# Patient Record
Sex: FEMALE | Race: WHITE | NOT HISPANIC OR LATINO | Employment: FULL TIME | URBAN - METROPOLITAN AREA
[De-identification: names, ages, dates, MRNs, and addresses within clinical notes are randomized per-mention and may not be internally consistent; named-entity substitution may affect disease eponyms.]

---
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
## 2023-01-05 ENCOUNTER — OFFICE VISIT (OUTPATIENT)
Dept: BEHAVIORAL HEALTH | Facility: CLINIC | Age: 18
End: 2023-01-05
Payer: COMMERCIAL

## 2023-01-05 DIAGNOSIS — F32.0 CURRENT MILD EPISODE OF MAJOR DEPRESSIVE DISORDER, UNSPECIFIED WHETHER RECURRENT (HCC): ICD-10-CM

## 2023-01-05 PROCEDURE — 90791 PSYCH DIAGNOSTIC EVALUATION: CPT | Performed by: SOCIAL WORKER

## 2023-01-05 ASSESSMENT — PATIENT HEALTH QUESTIONNAIRE - PHQ9
5. POOR APPETITE OR OVEREATING: NOT AT ALL
4. FEELING TIRED OR HAVING LITTLE ENERGY: MORE THAN HALF THE DAYS
SUM OF ALL RESPONSES TO PHQ9 QUESTIONS 1 AND 2: 3
SUM OF ALL RESPONSES TO PHQ QUESTIONS 1-9: 7
2. FEELING DOWN, DEPRESSED, IRRITABLE, OR HOPELESS: MORE THAN HALF THE DAYS
9. THOUGHTS THAT YOU WOULD BE BETTER OFF DEAD, OR OF HURTING YOURSELF: NOT AT ALL
8. MOVING OR SPEAKING SO SLOWLY THAT OTHER PEOPLE COULD HAVE NOTICED. OR THE OPPOSITE, BEING SO FIGETY OR RESTLESS THAT YOU HAVE BEEN MOVING AROUND A LOT MORE THAN USUAL: NOT AT ALL
5. POOR APPETITE OR OVEREATING: 0 - NOT AT ALL
3. TROUBLE FALLING OR STAYING ASLEEP OR SLEEPING TOO MUCH: NOT AT ALL
CLINICAL INTERPRETATION OF PHQ2 SCORE: 2
7. TROUBLE CONCENTRATING ON THINGS, SUCH AS READING THE NEWSPAPER OR WATCHING TELEVISION: NOT AT ALL
6. FEELING BAD ABOUT YOURSELF - OR THAT YOU ARE A FAILURE OR HAVE LET YOURSELF OR YOUR FAMILY DOWN: MORE THAN HALF THE DAYS
SUM OF ALL RESPONSES TO PHQ QUESTIONS 1-9: 6
1. LITTLE INTEREST OR PLEASURE IN DOING THINGS: SEVERAL DAYS

## 2023-01-05 ASSESSMENT — ANXIETY QUESTIONNAIRES
GAD7 TOTAL SCORE: 3
2. NOT BEING ABLE TO STOP OR CONTROL WORRYING: NOT AT ALL
7. FEELING AFRAID AS IF SOMETHING AWFUL MIGHT HAPPEN: NOT AT ALL
IF YOU CHECKED OFF ANY PROBLEMS ON THIS QUESTIONNAIRE, HOW DIFFICULT HAVE THESE PROBLEMS MADE IT FOR YOU TO DO YOUR WORK, TAKE CARE OF THINGS AT HOME, OR GET ALONG WITH OTHER PEOPLE: NOT DIFFICULT AT ALL
3. WORRYING TOO MUCH ABOUT DIFFERENT THINGS: NOT AT ALL
6. BECOMING EASILY ANNOYED OR IRRITABLE: SEVERAL DAYS
4. TROUBLE RELAXING: NOT AT ALL
5. BEING SO RESTLESS THAT IT IS HARD TO SIT STILL: NOT AT ALL
1. FEELING NERVOUS, ANXIOUS, OR ON EDGE: MORE THAN HALF THE DAYS
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
## 2023-01-27 ENCOUNTER — OFFICE VISIT (OUTPATIENT)
Dept: BEHAVIORAL HEALTH | Facility: CLINIC | Age: 18
End: 2023-01-27
Payer: COMMERCIAL

## 2023-01-27 ENCOUNTER — EH NON-PROVIDER (OUTPATIENT)
Dept: OCCUPATIONAL MEDICINE | Facility: CLINIC | Age: 18
End: 2023-01-27
Payer: COMMERCIAL

## 2023-01-27 VITALS — SYSTOLIC BLOOD PRESSURE: 135 MMHG | HEART RATE: 80 BPM | DIASTOLIC BLOOD PRESSURE: 70 MMHG

## 2023-01-27 DIAGNOSIS — F33.42 RECURRENT MAJOR DEPRESSIVE DISORDER, IN FULL REMISSION (HCC): ICD-10-CM

## 2023-01-27 PROBLEM — F12.90 MARIJUANA USE, EPISODIC: Status: RESOLVED | Noted: 2022-01-21 | Resolved: 2023-01-27

## 2023-01-27 PROCEDURE — 99213 OFFICE O/P EST LOW 20 MIN: CPT | Performed by: PSYCHIATRY & NEUROLOGY

## 2023-01-27 RX ORDER — VENLAFAXINE 75 MG/1
75 TABLET ORAL DAILY
Qty: 90 TABLET | Refills: 2 | Status: SHIPPED | OUTPATIENT
Start: 2023-01-27 | End: 2023-04-28 | Stop reason: SDUPTHER

## 2023-01-27 RX ORDER — BUPROPION HYDROCHLORIDE 300 MG/1
300 TABLET ORAL EVERY MORNING
Qty: 90 TABLET | Refills: 2 | Status: SHIPPED | OUTPATIENT
Start: 2023-01-27 | End: 2023-04-28 | Stop reason: SDUPTHER

## 2023-01-27 ASSESSMENT — ENCOUNTER SYMPTOMS
EYES NEGATIVE: 1
MUSCULOSKELETAL NEGATIVE: 1
DECREASED APPETITE: 0
PANIC: 0
DECREASED NEED FOR SLEEP: 0
DELUSIONS: 0
INCREASED ENERGY: 0
RESPIRATORY NEGATIVE: 1
CONSTITUTIONAL NEGATIVE: 1
NEUROLOGICAL NEGATIVE: 1
PREOCCUPATION: 0
THOUGHT CONTENT - SHAME: 0
PREMATURE MORNING AWAKENING: 0
GASTROINTESTINAL NEGATIVE: 1
HOPELESSNESS: 0
CARDIOVASCULAR NEGATIVE: 1
INCREASED GOAL-DIRECTED ACTIVITY: 0
HYPERSOMNIA: 0
COMPULSIONS: 0
PARANOIA: 0
DECREASED ENERGY: 0
AWAKENING FROM SLEEP: 0
DIFFICULTY FALLING ASLEEP: 0

## 2023-01-27 ASSESSMENT — ANXIETY QUESTIONNAIRES
5. BEING SO RESTLESS THAT IT IS HARD TO SIT STILL: MORE THAN HALF THE DAYS
6. BECOMING EASILY ANNOYED OR IRRITABLE: NOT AT ALL
1. FEELING NERVOUS, ANXIOUS, OR ON EDGE: SEVERAL DAYS
3. WORRYING TOO MUCH ABOUT DIFFERENT THINGS: NOT AT ALL
7. FEELING AFRAID AS IF SOMETHING AWFUL MIGHT HAPPEN: NOT AT ALL
2. NOT BEING ABLE TO STOP OR CONTROL WORRYING: NOT AT ALL
4. TROUBLE RELAXING: SEVERAL DAYS
GAD7 TOTAL SCORE: 4
IF YOU CHECKED OFF ANY PROBLEMS ON THIS QUESTIONNAIRE, HOW DIFFICULT HAVE THESE PROBLEMS MADE IT FOR YOU TO DO YOUR WORK, TAKE CARE OF THINGS AT HOME, OR GET ALONG WITH OTHER PEOPLE: NOT DIFFICULT AT ALL

## 2023-01-27 ASSESSMENT — PATIENT HEALTH QUESTIONNAIRE - PHQ9
5. POOR APPETITE OR OVEREATING: 1 - SEVERAL DAYS
CLINICAL INTERPRETATION OF PHQ2 SCORE: 2
SUM OF ALL RESPONSES TO PHQ QUESTIONS 1-9: 9

## 2023-01-27 ASSESSMENT — SOCIAL DETERMINANTS OF HEALTH (SDOH): ANXIETY ASSOCIATED WITH SOCIAL SUPPORT SYSTEM: 0
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
## 2023-01-28 SDOH — HEALTH STABILITY: PHYSICAL HEALTH: ON AVERAGE, HOW MANY DAYS PER WEEK DO YOU ENGAGE IN MODERATE TO STRENUOUS EXERCISE (LIKE A BRISK WALK)?: 3 DAYS

## 2023-01-28 SDOH — ECONOMIC STABILITY: TRANSPORTATION INSECURITY
IN THE PAST 12 MONTHS, HAS THE LACK OF TRANSPORTATION KEPT YOU FROM MEDICAL APPOINTMENTS OR FROM GETTING MEDICATIONS?: NO

## 2023-01-28 SDOH — ECONOMIC STABILITY: INCOME INSECURITY: IN THE LAST 12 MONTHS, WAS THERE A TIME WHEN YOU WERE NOT ABLE TO PAY THE MORTGAGE OR RENT ON TIME?: NO

## 2023-01-28 SDOH — ECONOMIC STABILITY: FOOD INSECURITY: WITHIN THE PAST 12 MONTHS, YOU WORRIED THAT YOUR FOOD WOULD RUN OUT BEFORE YOU GOT MONEY TO BUY MORE.: SOMETIMES TRUE

## 2023-01-28 SDOH — ECONOMIC STABILITY: HOUSING INSECURITY: IN THE LAST 12 MONTHS, HOW MANY PLACES HAVE YOU LIVED?: 3

## 2023-01-28 SDOH — ECONOMIC STABILITY: FOOD INSECURITY: WITHIN THE PAST 12 MONTHS, THE FOOD YOU BOUGHT JUST DIDN'T LAST AND YOU DIDN'T HAVE MONEY TO GET MORE.: SOMETIMES TRUE

## 2023-01-28 SDOH — HEALTH STABILITY: PHYSICAL HEALTH: ON AVERAGE, HOW MANY MINUTES DO YOU ENGAGE IN EXERCISE AT THIS LEVEL?: 50 MIN

## 2023-01-28 SDOH — HEALTH STABILITY: MENTAL HEALTH
STRESS IS WHEN SOMEONE FEELS TENSE, NERVOUS, ANXIOUS, OR CAN'T SLEEP AT NIGHT BECAUSE THEIR MIND IS TROUBLED. HOW STRESSED ARE YOU?: NOT AT ALL

## 2023-01-28 SDOH — ECONOMIC STABILITY: HOUSING INSECURITY
IN THE LAST 12 MONTHS, WAS THERE A TIME WHEN YOU DID NOT HAVE A STEADY PLACE TO SLEEP OR SLEPT IN A SHELTER (INCLUDING NOW)?: NO

## 2023-01-28 SDOH — ECONOMIC STABILITY: INCOME INSECURITY: HOW HARD IS IT FOR YOU TO PAY FOR THE VERY BASICS LIKE FOOD, HOUSING, MEDICAL CARE, AND HEATING?: NOT VERY HARD

## 2023-01-28 SDOH — ECONOMIC STABILITY: TRANSPORTATION INSECURITY
IN THE PAST 12 MONTHS, HAS LACK OF RELIABLE TRANSPORTATION KEPT YOU FROM MEDICAL APPOINTMENTS, MEETINGS, WORK OR FROM GETTING THINGS NEEDED FOR DAILY LIVING?: NO

## 2023-01-28 SDOH — ECONOMIC STABILITY: TRANSPORTATION INSECURITY
IN THE PAST 12 MONTHS, HAS LACK OF TRANSPORTATION KEPT YOU FROM MEETINGS, WORK, OR FROM GETTING THINGS NEEDED FOR DAILY LIVING?: NO

## 2023-01-28 ASSESSMENT — SOCIAL DETERMINANTS OF HEALTH (SDOH)
DO YOU BELONG TO ANY CLUBS OR ORGANIZATIONS SUCH AS CHURCH GROUPS UNIONS, FRATERNAL OR ATHLETIC GROUPS, OR SCHOOL GROUPS?: NO
HOW OFTEN DO YOU ATTENT MEETINGS OF THE CLUB OR ORGANIZATION YOU BELONG TO?: NEVER
IN A TYPICAL WEEK, HOW MANY TIMES DO YOU TALK ON THE PHONE WITH FAMILY, FRIENDS, OR NEIGHBORS?: MORE THAN THREE TIMES A WEEK
HOW OFTEN DO YOU ATTEND CHURCH OR RELIGIOUS SERVICES?: 1 TO 4 TIMES PER YEAR
HOW MANY DRINKS CONTAINING ALCOHOL DO YOU HAVE ON A TYPICAL DAY WHEN YOU ARE DRINKING: PATIENT DOES NOT DRINK
ARE YOU MARRIED, WIDOWED, DIVORCED, SEPARATED, NEVER MARRIED, OR LIVING WITH A PARTNER?: NEVER MARRIED
WITHIN THE PAST 12 MONTHS, YOU WORRIED THAT YOUR FOOD WOULD RUN OUT BEFORE YOU GOT THE MONEY TO BUY MORE: SOMETIMES TRUE
HOW OFTEN DO YOU HAVE A DRINK CONTAINING ALCOHOL: NEVER
HOW OFTEN DO YOU HAVE SIX OR MORE DRINKS ON ONE OCCASION: NEVER
DO YOU BELONG TO ANY CLUBS OR ORGANIZATIONS SUCH AS CHURCH GROUPS UNIONS, FRATERNAL OR ATHLETIC GROUPS, OR SCHOOL GROUPS?: NO
HOW OFTEN DO YOU ATTENT MEETINGS OF THE CLUB OR ORGANIZATION YOU BELONG TO?: NEVER
ARE YOU MARRIED, WIDOWED, DIVORCED, SEPARATED, NEVER MARRIED, OR LIVING WITH A PARTNER?: NEVER MARRIED
HOW HARD IS IT FOR YOU TO PAY FOR THE VERY BASICS LIKE FOOD, HOUSING, MEDICAL CARE, AND HEATING?: NOT VERY HARD
HOW OFTEN DO YOU GET TOGETHER WITH FRIENDS OR RELATIVES?: MORE THAN THREE TIMES A WEEK
IN A TYPICAL WEEK, HOW MANY TIMES DO YOU TALK ON THE PHONE WITH FAMILY, FRIENDS, OR NEIGHBORS?: MORE THAN THREE TIMES A WEEK
HOW OFTEN DO YOU ATTEND CHURCH OR RELIGIOUS SERVICES?: 1 TO 4 TIMES PER YEAR
HOW OFTEN DO YOU GET TOGETHER WITH FRIENDS OR RELATIVES?: MORE THAN THREE TIMES A WEEK

## 2023-01-28 ASSESSMENT — LIFESTYLE VARIABLES
HOW OFTEN DO YOU HAVE A DRINK CONTAINING ALCOHOL: NEVER
AUDIT-C TOTAL SCORE: 0
HOW MANY STANDARD DRINKS CONTAINING ALCOHOL DO YOU HAVE ON A TYPICAL DAY: PATIENT DOES NOT DRINK
HOW OFTEN DO YOU HAVE SIX OR MORE DRINKS ON ONE OCCASION: NEVER
SKIP TO QUESTIONS 9-10: 1

## 2023-01-31 ENCOUNTER — OFFICE VISIT (OUTPATIENT)
Dept: MEDICAL GROUP | Facility: PHYSICIAN GROUP | Age: 18
End: 2023-01-31
Payer: COMMERCIAL

## 2023-01-31 VITALS
RESPIRATION RATE: 18 BRPM | WEIGHT: 222.8 LBS | SYSTOLIC BLOOD PRESSURE: 120 MMHG | HEART RATE: 100 BPM | HEIGHT: 68 IN | TEMPERATURE: 98.1 F | DIASTOLIC BLOOD PRESSURE: 84 MMHG | OXYGEN SATURATION: 99 % | BODY MASS INDEX: 33.77 KG/M2

## 2023-01-31 DIAGNOSIS — Z13.29 SCREENING FOR ENDOCRINE, NUTRITIONAL, METABOLIC AND IMMUNITY DISORDER: ICD-10-CM

## 2023-01-31 DIAGNOSIS — Z13.0 SCREENING FOR ENDOCRINE, NUTRITIONAL, METABOLIC AND IMMUNITY DISORDER: ICD-10-CM

## 2023-01-31 DIAGNOSIS — Z13.21 SCREENING FOR ENDOCRINE, NUTRITIONAL, METABOLIC AND IMMUNITY DISORDER: ICD-10-CM

## 2023-01-31 DIAGNOSIS — Z11.3 SCREEN FOR SEXUALLY TRANSMITTED DISEASES: ICD-10-CM

## 2023-01-31 DIAGNOSIS — E78.2 MODERATE MIXED HYPERLIPIDEMIA NOT REQUIRING STATIN THERAPY: ICD-10-CM

## 2023-01-31 DIAGNOSIS — G43.829 MENSTRUAL MIGRAINE WITHOUT STATUS MIGRAINOSUS, NOT INTRACTABLE: ICD-10-CM

## 2023-01-31 DIAGNOSIS — Z13.71 TESTING OF FEMALE FOR GENETIC DISEASE CARRIER STATUS: ICD-10-CM

## 2023-01-31 DIAGNOSIS — F41.1 GENERALIZED ANXIETY DISORDER: ICD-10-CM

## 2023-01-31 DIAGNOSIS — Z13.0 SCREENING FOR IRON DEFICIENCY ANEMIA: ICD-10-CM

## 2023-01-31 DIAGNOSIS — F41.0 PANIC ATTACK: ICD-10-CM

## 2023-01-31 DIAGNOSIS — Z13.228 SCREENING FOR ENDOCRINE, NUTRITIONAL, METABOLIC AND IMMUNITY DISORDER: ICD-10-CM

## 2023-01-31 DIAGNOSIS — K21.9 GASTROESOPHAGEAL REFLUX DISEASE WITHOUT ESOPHAGITIS: ICD-10-CM

## 2023-01-31 PROBLEM — G43.909 MIGRAINE: Status: ACTIVE | Noted: 2020-02-01

## 2023-01-31 PROBLEM — F32.4 MAJOR DEPRESSIVE DISORDER, SINGLE EPISODE, IN PARTIAL REMISSION (HCC): Status: ACTIVE | Noted: 2020-11-10

## 2023-01-31 PROBLEM — E78.5 HYPERLIPIDEMIA: Status: ACTIVE | Noted: 2019-11-06

## 2023-01-31 PROCEDURE — 99214 OFFICE O/P EST MOD 30 MIN: CPT

## 2023-01-31 ASSESSMENT — ENCOUNTER SYMPTOMS
DIZZINESS: 0
EYE PAIN: 0
PALPITATIONS: 0
HALLUCINATIONS: 0
WEIGHT LOSS: 0
HEMOPTYSIS: 0
CHILLS: 0
FEVER: 0
DEPRESSION: 0
HEARTBURN: 1
HEADACHES: 0
NAUSEA: 0
COUGH: 0
BLURRED VISION: 0
BRUISES/BLEEDS EASILY: 0

## 2023-01-31 ASSESSMENT — FIBROSIS 4 INDEX: FIB4 SCORE: 0.21

## 2023-01-31 NOTE — ASSESSMENT & PLAN NOTE
Patient reports she started getting migraines when she got her menstrual cycle for the first time. It has not been as bad recently, but she still gets them a few times a year. They resolve with rest and an excedrin migraine medication.

## 2023-01-31 NOTE — ASSESSMENT & PLAN NOTE
Patient reports that she has a strong family history of high cholesterol. She thinks she has had labs before that confirms she has this as well.

## 2023-01-31 NOTE — PROGRESS NOTES
CC:    Chief Complaint   Patient presents with    [facility]        HISTORY OF THE PRESENT ILLNESS: This is a pleasant 17 y.o. female presenting today to [facility], who wishes to discuss the following problems listed below:     Generalized anxiety disorder  Patient reports that she was diagnosed with depression and anxiety in around 2020, during the pandemic. She sees a psychiatrist and has a therapist, both of which she finds helpful. She has been on the wellbutrin 300mg XL and venlafaxine 75mg daily combination for about a year.     Migraine  Patient reports she started getting migraines when she got her menstrual cycle for the first time. It has not been as bad recently, but she still gets them a few times a year. They resolve with rest and an excedrin migraine medication.     Hyperlipidemia  Patient reports that she has a strong family history of high cholesterol. She thinks she has had labs before that confirms she has this as well.       Past Medical History:   Diagnosis Date    Anxiety     Depression     Ovarian cyst        Allergies: Influenza vaccine live    Current Outpatient Medications   Medication Sig Dispense Refill    venlafaxine (EFFEXOR) 75 MG Tab Take 1 Tablet by mouth every day. 90 Tablet 2    buPROPion (WELLBUTRIN XL) 300 MG XL tablet Take 1 Tablet by mouth every morning. 90 Tablet 2    etonogestrel (NEXPLANON) 68 MG Implant implant Inject  under the skin.       No current facility-administered medications for this visit.       ROS:     Review of Systems   Constitutional:  Negative for chills, fever and weight loss.   HENT:  Negative for hearing loss and tinnitus.    Eyes:  Negative for blurred vision and pain.   Respiratory:  Negative for cough and hemoptysis.    Cardiovascular:  Negative for chest pain, palpitations and leg swelling.   Gastrointestinal:  Positive for heartburn. Negative for nausea.   Genitourinary:  Negative for dysuria.   Musculoskeletal:  Negative for joint pain.  "  Skin:  Negative for itching and rash.   Neurological:  Negative for dizziness and headaches.   Endo/Heme/Allergies:  Does not bruise/bleed easily.   Psychiatric/Behavioral:  Negative for depression, hallucinations and suicidal ideas.      Exam: /84 (BP Location: Left arm, Patient Position: Sitting, BP Cuff Size: Adult)   Pulse 100   Temp 36.7 °C (98.1 °F) (Temporal)   Resp 18   Ht 1.72 m (5' 7.72\")   Wt 101 kg (222 lb 12.8 oz)   SpO2 99%  Body mass index is 34.16 kg/m².    Physical Exam  Constitutional:       Appearance: Normal appearance.   Cardiovascular:      Rate and Rhythm: Normal rate and regular rhythm.      Heart sounds: Normal heart sounds.   Pulmonary:      Effort: Pulmonary effort is normal.      Breath sounds: Normal breath sounds.   Musculoskeletal:      Cervical back: Normal range of motion and neck supple.   Lymphadenopathy:      Cervical: No cervical adenopathy.   Neurological:      General: No focal deficit present.      Mental Status: She is alert and oriented to person, place, and time.   Psychiatric:         Mood and Affect: Mood normal.         Behavior: Behavior normal.         Assessment/Plan:    17 y.o. female with the followin. Generalized anxiety disorder  2. Panic attack  Chronic, stable.  Continue Wellbutrin extended release 300 mg every morning as well as venlafaxine 75 mg daily.  She does report that she felt like she had some anxiety prior to getting her Nexplanon implant, we did discuss that sometimes Nexplanon implants can exacerbate or worsen depression and anxiety.  She is happy with her Nexplanon and does not want this removed    3. Gastroesophageal reflux disease without esophagitis  Chronic, not at goal.  Obtain H. pylori breath test, otherwise patient may manage with over-the-counter omeprazole 20 mg as needed  - H. PYLORI, UREA BREATH TEST, ADULT; Future    4. Menstrual migraine without status migrainosus, not intractable  Chronic, stable.  Continue " management with over-the-counter Excedrin Migraine    5. Moderate mixed hyperlipidemia not requiring statin therapy  Chronic, unclear status.  Obtain lipids for further evaluation  - Lipid Profile; Future    6. Testing of female for genetic disease carrier status  - Referral to Genetic Research Studies    7. Screening for endocrine, nutritional, metabolic and immunity disorder  - Comp Metabolic Panel; Future  - TSH WITH REFLEX TO FT4; Future  - VITAMIN D,25 HYDROXY (DEFICIENCY); Future    8. Screening for iron deficiency anemia  - CBC WITHOUT DIFFERENTIAL; Future    9. Screen for sexually transmitted diseases  - Chlamydia/GC, PCR (Urine); Future    Follow-up: Return in about 4 weeks (around 2/28/2023) for lab follow up.    Health Maintenance: Completed    I have placed the below orders and discussed them with an approved delegating provider.  The MA is performing the below orders under the direction of Rita Magallon MD.    Please note that this dictation was created using voice recognition software. I have made every reasonable attempt to correct obvious errors, but I expect that there are errors of grammar and possibly content that I did not discover before finalizing the note.    Electronically signed by MAI Wolf on January 31, 2023

## 2023-01-31 NOTE — ASSESSMENT & PLAN NOTE
Patient reports that she was diagnosed with depression and anxiety in around 2020, during the pandemic. She sees a psychiatrist and has a therapist, both of which she finds helpful. She has been on the wellbutrin 300mg XL and venlafaxine 75mg daily combination for about a year.

## 2023-02-15 ENCOUNTER — RESEARCH ENCOUNTER (OUTPATIENT)
Dept: RESEARCH | Facility: WORKSITE | Age: 18
End: 2023-02-15
Payer: COMMERCIAL

## 2023-02-15 DIAGNOSIS — Z00.6 RESEARCH STUDY PATIENT: ICD-10-CM

## 2023-02-21 ENCOUNTER — HOSPITAL ENCOUNTER (OUTPATIENT)
Dept: LAB | Facility: MEDICAL CENTER | Age: 18
End: 2023-02-21
Payer: COMMERCIAL

## 2023-02-21 DIAGNOSIS — Z11.3 SCREEN FOR SEXUALLY TRANSMITTED DISEASES: ICD-10-CM

## 2023-02-21 DIAGNOSIS — Z13.0 SCREENING FOR IRON DEFICIENCY ANEMIA: ICD-10-CM

## 2023-02-21 DIAGNOSIS — Z13.29 SCREENING FOR ENDOCRINE, NUTRITIONAL, METABOLIC AND IMMUNITY DISORDER: ICD-10-CM

## 2023-02-21 DIAGNOSIS — Z13.21 SCREENING FOR ENDOCRINE, NUTRITIONAL, METABOLIC AND IMMUNITY DISORDER: ICD-10-CM

## 2023-02-21 DIAGNOSIS — Z13.0 SCREENING FOR ENDOCRINE, NUTRITIONAL, METABOLIC AND IMMUNITY DISORDER: ICD-10-CM

## 2023-02-21 DIAGNOSIS — Z13.228 SCREENING FOR ENDOCRINE, NUTRITIONAL, METABOLIC AND IMMUNITY DISORDER: ICD-10-CM

## 2023-02-21 DIAGNOSIS — K21.9 GASTROESOPHAGEAL REFLUX DISEASE WITHOUT ESOPHAGITIS: ICD-10-CM

## 2023-02-21 DIAGNOSIS — E78.2 MODERATE MIXED HYPERLIPIDEMIA NOT REQUIRING STATIN THERAPY: ICD-10-CM

## 2023-02-21 LAB
ALBUMIN SERPL BCP-MCNC: 4.6 G/DL (ref 3.2–4.9)
ALBUMIN/GLOB SERPL: 1.8 G/DL
ALP SERPL-CCNC: 63 U/L (ref 30–99)
ALT SERPL-CCNC: 29 U/L (ref 2–50)
ANION GAP SERPL CALC-SCNC: 8 MMOL/L (ref 7–16)
AST SERPL-CCNC: 13 U/L (ref 12–45)
BILIRUB SERPL-MCNC: 0.2 MG/DL (ref 0.1–1.5)
BUN SERPL-MCNC: 10 MG/DL (ref 8–22)
CALCIUM ALBUM COR SERPL-MCNC: 8.7 MG/DL (ref 8.5–10.5)
CALCIUM SERPL-MCNC: 9.2 MG/DL (ref 8.5–10.5)
CHLORIDE SERPL-SCNC: 104 MMOL/L (ref 96–112)
CHOLEST SERPL-MCNC: 191 MG/DL (ref 100–199)
CO2 SERPL-SCNC: 25 MMOL/L (ref 20–33)
CREAT SERPL-MCNC: 0.69 MG/DL (ref 0.5–1.4)
ERYTHROCYTE [DISTWIDTH] IN BLOOD BY AUTOMATED COUNT: 41.1 FL (ref 35.9–50)
FASTING STATUS PATIENT QL REPORTED: NORMAL
GFR SERPLBLD CREATININE-BSD FMLA CKD-EPI: 128 ML/MIN/1.73 M 2
GLOBULIN SER CALC-MCNC: 2.6 G/DL (ref 1.9–3.5)
GLUCOSE SERPL-MCNC: 94 MG/DL (ref 65–99)
HCT VFR BLD AUTO: 46.3 % (ref 37–47)
HDLC SERPL-MCNC: 37 MG/DL
HGB BLD-MCNC: 15.1 G/DL (ref 12–16)
LDLC SERPL CALC-MCNC: 127 MG/DL
MCH RBC QN AUTO: 28.9 PG (ref 27–33)
MCHC RBC AUTO-ENTMCNC: 32.6 G/DL (ref 33.6–35)
MCV RBC AUTO: 88.7 FL (ref 81.4–97.8)
PLATELET # BLD AUTO: 279 K/UL (ref 164–446)
PMV BLD AUTO: 10.1 FL (ref 9–12.9)
POTASSIUM SERPL-SCNC: 4.3 MMOL/L (ref 3.6–5.5)
PROT SERPL-MCNC: 7.2 G/DL (ref 6–8.2)
RBC # BLD AUTO: 5.22 M/UL (ref 4.2–5.4)
SODIUM SERPL-SCNC: 137 MMOL/L (ref 135–145)
TRIGL SERPL-MCNC: 136 MG/DL (ref 0–149)
WBC # BLD AUTO: 9.1 K/UL (ref 4.8–10.8)

## 2023-02-21 PROCEDURE — 82306 VITAMIN D 25 HYDROXY: CPT

## 2023-02-21 PROCEDURE — 80061 LIPID PANEL: CPT

## 2023-02-21 PROCEDURE — 83013 H PYLORI (C-13) BREATH: CPT

## 2023-02-21 PROCEDURE — 85027 COMPLETE CBC AUTOMATED: CPT

## 2023-02-21 PROCEDURE — 80053 COMPREHEN METABOLIC PANEL: CPT

## 2023-02-21 PROCEDURE — 87491 CHLMYD TRACH DNA AMP PROBE: CPT

## 2023-02-21 PROCEDURE — 36415 COLL VENOUS BLD VENIPUNCTURE: CPT

## 2023-02-21 PROCEDURE — 84443 ASSAY THYROID STIM HORMONE: CPT

## 2023-02-21 PROCEDURE — 87591 N.GONORRHOEAE DNA AMP PROB: CPT

## 2023-02-22 LAB
25(OH)D3 SERPL-MCNC: 24 NG/ML (ref 30–100)
C TRACH DNA SPEC QL NAA+PROBE: NEGATIVE
N GONORRHOEA DNA SPEC QL NAA+PROBE: NEGATIVE
SPECIMEN SOURCE: NORMAL
TSH SERPL DL<=0.005 MIU/L-ACNC: 4.41 UIU/ML (ref 0.38–5.33)

## 2023-02-23 LAB — UREA BREATH TEST QL: NEGATIVE

## 2023-02-27 ENCOUNTER — OFFICE VISIT (OUTPATIENT)
Dept: BEHAVIORAL HEALTH | Facility: CLINIC | Age: 18
End: 2023-02-27
Payer: COMMERCIAL

## 2023-02-27 DIAGNOSIS — F41.1 GENERALIZED ANXIETY DISORDER: ICD-10-CM

## 2023-02-27 PROCEDURE — 90834 PSYTX W PT 45 MINUTES: CPT | Performed by: SOCIAL WORKER

## 2023-02-27 NOTE — PROGRESS NOTES
"[facility]   Psychotherapy Progress Note        Name: SHAHAB WEAVER  MRN: [id number]  : 2005  Age: 17 y.o.  Date of assessment: 2023  PCP: DEIDRE Groves  Persons in attendance: Patient  Total session time: 50 minutes      Topics addressed in psychotherapy include: \" Things are pretty good. My dad's girlfriend had the baby but I did not see him yet, it Is kind of weird she seems to stay in her room with the baby according to my siblings. They have not seen him either but one or two times.\" AT this time she is not comfortable about discussing this with her father.  Discussion on advocating for herself. Discussed a situation at work where she did not notify her supervisor of something and felt at first she was being blamed but her supervisor listened to her and she felt better. Role played different ways the conversation could have been done. Also discussed perhaps exploring some roups that she might join to meet more people her age.practicing with her mother and boyfriend and even instructors at school advocating for herself.    Objective Observations:   Participation:Active verbal participation and Open to feedback   Grooming:Neat   Cognition:Alert   Eye Contact:Good   Mood:Anxious if she focuses on self   Affect:Congruent with content   Thought Process:Goal-directed   Speech:Rate within normal limits and Volume within normal limits    Current Risk:   Suicide: not indicated   Homicide: not indicated   Self-Harm: not indicated   Relapse: not indicated   Safety Plan Reviewed: not applicable    Care Plan Updated: Yes    Does patient express agreement with the above plan? Yes     Diagnosis:  1. Generalized anxiety disorder        Referral appointment(s) scheduled?  Made earlier        Clay Paul L.C.S.W.   "

## 2023-03-20 ENCOUNTER — OFFICE VISIT (OUTPATIENT)
Dept: BEHAVIORAL HEALTH | Facility: CLINIC | Age: 18
End: 2023-03-20
Payer: COMMERCIAL

## 2023-03-20 DIAGNOSIS — F41.1 GENERALIZED ANXIETY DISORDER: ICD-10-CM

## 2023-03-20 PROCEDURE — 90834 PSYTX W PT 45 MINUTES: CPT | Performed by: SOCIAL WORKER

## 2023-03-20 NOTE — PROGRESS NOTES
"[facility]   psychotherapy Progress Note        Name: SHAHAB WEAVER  MRN: [id number]  : 2005  Age: 17 y.o.  Date of assessment: 3/20/2023  PCP: DEIDRE Groves  Persons in attendance: Patient  Total session time: 48 minutes      Topics addressed in psychotherapy include: \" My sister's surgery was today we went down over the weekend. I was able to get aalong with my dad by just staying focused on my sister. It went okay. My mom stayed and should be back tomorrow if my sister is released. She wants to go to [facility] here but my dad told her she should stay in [place].  My boyfriend quit his job without having another one and he also charge up his credit card.This will be the second time I have bailed out. I do not want to do that all of the time.\"    Discussion on communication in relationships and setting up expectations of each other now when the relationship is still pretty young.    Objective Observations:   Participation:Active verbal participation and Open to feedback   Grooming:Casual and Neat   Cognition:Alert   Eye Contact:Good   Mood:Euthymic   Affect:Congruent with content   Thought Process:Goal-directed   Speech:Rate within normal limits and Volume within normal limits    Current Risk:   Suicide: not indicated   Homicide: not indicated   Self-Harm: not indicated   Relapse: not indicated   Safety Plan Reviewed: not applicable    Care Plan Updated:  no changes    Does patient express agreement with the above plan? Yes     Diagnosis:  No diagnosis found.    Referral appointment(s) scheduled?  Made prior        Clay Paul L.C.S.W.   "

## 2023-04-19 LAB
APOB+LDLR+PCSK9 GENE MUT ANL BLD/T: NOT DETECTED
BRCA1+BRCA2 DEL+DUP + FULL MUT ANL BLD/T: NOT DETECTED
MLH1+MSH2+MSH6+PMS2 GN DEL+DUP+FUL M: NOT DETECTED

## 2023-04-28 ENCOUNTER — OFFICE VISIT (OUTPATIENT)
Dept: BEHAVIORAL HEALTH | Facility: CLINIC | Age: 18
End: 2023-04-28
Payer: COMMERCIAL

## 2023-04-28 VITALS — DIASTOLIC BLOOD PRESSURE: 72 MMHG | SYSTOLIC BLOOD PRESSURE: 112 MMHG

## 2023-04-28 DIAGNOSIS — F41.1 GENERALIZED ANXIETY DISORDER: ICD-10-CM

## 2023-04-28 DIAGNOSIS — F33.42 RECURRENT MAJOR DEPRESSIVE DISORDER, IN FULL REMISSION (HCC): ICD-10-CM

## 2023-04-28 PROCEDURE — 99214 OFFICE O/P EST MOD 30 MIN: CPT | Performed by: PSYCHIATRY & NEUROLOGY

## 2023-04-28 RX ORDER — VENLAFAXINE 75 MG/1
75 TABLET ORAL DAILY
Qty: 90 TABLET | Refills: 1 | Status: SHIPPED | OUTPATIENT
Start: 2023-04-28 | End: 2023-08-18 | Stop reason: SDUPTHER

## 2023-04-28 RX ORDER — BUPROPION HYDROCHLORIDE 300 MG/1
300 TABLET ORAL EVERY MORNING
Qty: 90 TABLET | Refills: 1 | Status: SHIPPED | OUTPATIENT
Start: 2023-04-28 | End: 2023-08-18 | Stop reason: SDUPTHER

## 2023-04-28 ASSESSMENT — ENCOUNTER SYMPTOMS
DECREASED NEED FOR SLEEP: 0
DECREASED APPETITE: 0
PANIC: 0
COMPULSIONS: 0
CARDIOVASCULAR NEGATIVE: 1
DIFFICULTY FALLING ASLEEP: 0
INCREASED GOAL-DIRECTED ACTIVITY: 0
CONSTITUTIONAL NEGATIVE: 1
GASTROINTESTINAL NEGATIVE: 1
PARANOIA: 0
AWAKENING FROM SLEEP: 0
THOUGHT CONTENT - SHAME: 0
INCREASED ENERGY: 0
MUSCULOSKELETAL NEGATIVE: 1
HOPELESSNESS: 0
EYES NEGATIVE: 1
DELUSIONS: 0
RESPIRATORY NEGATIVE: 1
PREMATURE MORNING AWAKENING: 0
PREOCCUPATION: 0
NEUROLOGICAL NEGATIVE: 1
HYPERSOMNIA: 0
DECREASED ENERGY: 0

## 2023-04-28 ASSESSMENT — SOCIAL DETERMINANTS OF HEALTH (SDOH): ANXIETY ASSOCIATED WITH SOCIAL SUPPORT SYSTEM: 0

## 2023-04-28 NOTE — PROGRESS NOTES
"  PSYCHIATRY FOLLOW-UP NOTE      Name: SHAHAB MARTINEZ  MRN: [id number]  : 2005  Age: 17 y.o.  Date of assessment: 2023  PCP: DEIDRE Groves  Persons in attendance: Patient      REASON FOR VISIT/CHIEF COMPLAINT   Medication follow-up         SUBJECTIVE  HISTORY OF PRESENT ILLNESS  Shahab Martinez is a 17 y.o. old female comes in today to follow-up on care for depression and anxiety.  Patient states that she is doing very well.  States that she feels \"stable\" and has been for several months.  Has been taking both Effexor and Wellbutrin for almost 1 year.  Engaged in psychotherapy.  Does feel stressed at work as she has been taking on multiple roles.  However feeling work is for feeling and meaningful.  Denies SI HI AVH        PSYCHIATRIC REVIEW OF SYSTEMS:      MOOD SYMPTOMS:   Pertinent negatives - not sad, not irritable and not apathetic      ANXIETY:   Pertinent negatives - no excessive worry, no anxiety and no panic    SLEEP:   Pertinent negatives - no difficulty falling asleep, not awakening from sleep, no premature morning awakening, no decreased need for sleep and no hypersomnia     PSYCHOMOTOR/ENERGY:   Pertinent negatives - no decreased energy and no increased energy    EATING:   Pertinent negatives: no decreased appetite and no increased appetite    COGNITIVE:   Pertinent negatives: no obsessions, no compulsions, no preoccupation, no hopelessness and no shame     BEHAVIOR:   Pertinent negatives - no increased goal-directed activity    PSYCHOSIS:   Pertinent negatives - auditory hallucinations, visual hallucinations, delusions, ideas of reference and paranoia  SOCIAL:   Pertinent negatives - no history of withdrawal symptoms    SENSORY:        REVIEW OF SYSTEMS    Review of Systems   Constitutional: Negative.  Negative for decreased appetite.   HENT: Negative.     Eyes: Negative.    Respiratory: Negative.     Cardiovascular: Negative.    Gastrointestinal: Negative.  " "  Genitourinary: Negative.    Musculoskeletal: Negative.    Skin: Negative.    Neurological: Negative.    Endo/Heme/Allergies: Negative.    Psychiatric/Behavioral:  Negative for paranoia.           CURRENT MEDICATIONS:    Current Outpatient Medications:     buPROPion, 300 mg, Oral, QAM    venlafaxine, 75 mg, Oral, DAILY    Nexplanon, Inject  under the skin.        PAST PSYCHIATRIC MEDICATIONS CURRENT MEDICATIONS:   buspar  prozac  lamictal  Felt like she was on \"autopilot\" like everything was blurry    Current Outpatient Medications:     buPROPion, 300 mg, Oral, QAM    venlafaxine, 75 mg, Oral, DAILY    Nexplanon, Inject  under the skin.         ===============================================================  OBJECTIVE  PHYSICAL EXAMINAION:  Vital signs: /72 (BP Location: Left arm, Patient Position: Sitting, BP Cuff Size: Adult)   Musculoskeletal: Normal gait.   Abnormal movements: None    MENTAL STATUS EXAMINATION        General:   - Grooming and hygiene: Appropriate hygiene and grooming  - Apparent distress: NAD   - Behavior: Calm and appropriate  - Eye Contact: Within normal limits  - no psychomotor agitation or retardation  - Participation: Active verbal participation  Orientation: Grossly oriented to person, place and time  Mood: \"Pretty good\"  Affect: Congruent  Thought Process: Linear logical and goal directed  Thought Content: Denies suicidal or homicidal ideations, intent or plan.  No evidence of paranoia  Perception: Denies auditory or visual hallucinations. No delusions noted   Attention span and concentration: Intact   Speech: Regular rate, volume and prosody  Language: Appropriate   Insight: Good  Judgment: Good  Recent and remote memory: Grossly intact            SCREENINGS:      1/5/2023    10:00 AM 1/5/2023    10:26 AM 1/27/2023    11:00 AM   Depression Screen (PHQ-2/PHQ-9)   PHQ-2 Total Score  3    PHQ-2 Total Score 2  2   PHQ-9 Total Score  7    PHQ-9 Total Score 6  9           1/21/2022     " 1:39 PM 2023    10:27 AM 2023    11:23 AM   BOYD 7   BOYD-7 Total Score 17 3 4       No data recorded          MEDICAL RECORDS/LABS/DIAGNOSTIC TESTS REVIEWED:    Hospital Outpatient Visit on 2022   Component Date Value    QFT NIL 2022 0.04     QFT TB1 - NIL 2022 0.00     QFT TB2 - NIL 2022 0.00     QFT Mitogen - NIL 2022 >10.00     QFT Gold Plus 2022 Negative    Admission on 10/22/2021, Discharged on 10/22/2021   Component Date Value    Report 10/22/2021                      Value:[facility]    Test Date:  2021-10-22  Pt Name:    SHAHAB WEAVER             Department: ER  MRN:        [id number]                      Room:       [facility]  Gender:     Female                       Technician: [id number]  :        2005                   Requested By:KISHA TUBBS  Order #:    [id number]                    Reading MD: Kisha Tubbs MD    Measurements  Intervals                                Axis  Rate:       84                           P:          61  VT:         135                          QRS:        39  QRSD:       86                           T:          11  QT:         362  QTc:        429    Interpretive Statements  Sinus rhythm  Normal intervals  No ectopy or hypertrophy  No ST or T wave change  No previous ECG available for comparison  Electronically Signed On 10- 15:06:22 PDT by Kisha Tubbs MD      Beta-Hcg Urine 10/22/2021 Negative     WBC 10/22/2021 9.4     RBC 10/22/2021 5.60 (H)     Hemoglobin 10/22/2021 16.0     Hematocrit 10/22/2021 48.6 (H)     MCV 10/22/2021 86.8     MCH 10/22/2021 28.6     MCHC 10/22/2021 32.9 (L)     RDW 10/22/2021 45.2     Platelet Count 10/22/2021 312     MPV 10/22/2021 9.8     Neutrophils-Polys 10/22/2021 67.30     Lymphocytes 10/22/2021 24.60     Monocytes 10/22/2021 7.10     Eosinophils 10/22/2021 0.40     Basophils 10/22/2021 0.30     Immature Granulocytes 10/22/2021 0.30     Nucleated  RBC 10/22/2021 0.00     Neutrophils (Absolute) 10/22/2021 6.33     Lymphs (Absolute) 10/22/2021 2.32     Monos (Absolute) 10/22/2021 0.67     Eos (Absolute) 10/22/2021 0.04     Baso (Absolute) 10/22/2021 0.03     Immature Granulocytes (a* 10/22/2021 0.03     NRBC (Absolute) 10/22/2021 0.00     Sodium 10/22/2021 139     Potassium 10/22/2021 4.4     Chloride 10/22/2021 105     Co2 10/22/2021 22     Anion Gap 10/22/2021 12.0     Glucose 10/22/2021 97     Bun 10/22/2021 11     Creatinine 10/22/2021 0.73     Calcium 10/22/2021 10.3     AST(SGOT) 10/22/2021 24     ALT(SGPT) 10/22/2021 50     Alkaline Phosphatase 10/22/2021 72     Total Bilirubin 10/22/2021 <0.2     Albumin 10/22/2021 4.9     Total Protein 10/22/2021 7.7     Globulin 10/22/2021 2.8     A-G Ratio 10/22/2021 1.8     GFR If  10/22/2021 >60     GFR If Non  Ameri* 10/22/2021 >60              ===============================================================  ASSESSMENT  Patient psychiatric symptoms are currently stable on both Wellbutrin and with Effexor.  Patient has not achieved a full year of psychiatric stability on these medications.  Recommending 1 full year in combination with high-quality psychotherapy (to which patient is receiving) prior to consideration of tapering medications.                CURRENT RISK:   Suicidal: Low  Homicidal: Low  Self-Harm: Low  Relapse: Low  Crisis Safety Plan Reviewed: Not Indicated     DIAGNOSES  1. Recurrent major depressive disorder, in full remission (HCC)    2. Generalized anxiety disorder            PLAN:      Education: Discussed tapering regimen after 1 year of stability   Psychotherapy: Recommended continued psychotherapy with current therapist   Labs/studies: None     Medications:  Refill Wellbutrin 300 mg extended release daily for 180 days   Refill Effexor 75 mg daily for 180 days   Other:          Medication options, alternatives (including no medications) and medication risks/benefits/side  effects were discussed in detail.  Explained importance of contraceptive measures while on psychotropic medications, educated to let provider know if ever pregnant or wanting to become pregnant. Verbalized understanding.  The patient was advised to call, message provider on MyChart, or come in to the clinic if symptoms worsen or if any future questions/issues regarding their medications arise; the patient verbalized understanding and agreement.    The patient was educated to call 911, call the suicide hotline, or go to local ER if having thoughts of suicide or homicide; verbalized understanding.        Discussed with patient that I will be leaving clinic.  Recommended patient calling  in June to establish care with new provider  Next Appointment:  instruction provided on how to make the next appointment.     The proposed treatment plan was discussed with the patient who was provided the opportunity to ask questions and make suggestions regarding alternative treatment. Patient verbalized understanding and expressed agreement with the plan.         Sheridan Yousif D.O.    CC:   Pcp Pt States None    This note was created using voice recognition software (Dragon). The accuracy of the dictation is limited by the abilities of the software. I have reviewed the note prior to signing, however some errors in grammar and context are still possible. If you have any questions related to this note please do not hesitate to contact our office.

## 2023-05-04 ENCOUNTER — OFFICE VISIT (OUTPATIENT)
Dept: BEHAVIORAL HEALTH | Facility: CLINIC | Age: 18
End: 2023-05-04
Payer: COMMERCIAL

## 2023-05-04 DIAGNOSIS — F41.1 GENERALIZED ANXIETY DISORDER: ICD-10-CM

## 2023-05-04 DIAGNOSIS — F32.4 MAJOR DEPRESSIVE DISORDER, SINGLE EPISODE, IN PARTIAL REMISSION (HCC): ICD-10-CM

## 2023-05-04 PROCEDURE — 90837 PSYTX W PT 60 MINUTES: CPT | Performed by: SOCIAL WORKER

## 2023-05-04 NOTE — PROGRESS NOTES
"[facility]   Psychotherapy Progress Note        Name: SHAHAB WEAVER  MRN: [id number]  : 2005  Age: 17 y.o.  Date of assessment: 2023  PCP: DEIDRE Groves  Persons in attendance: Patient and Biological Father  Total session time: 56 minutes      Topics addressed in psychotherapy include: \" I brought my dad today because I think we could learn to communicate better. I never feel like I am heard when we do talk. He expressed that when he attempts to hug her she is like hugging a fish,\" I feel she does not want me and unloved by her\"  Worked having them practice using \"I\" messages with one another. Leaving out \"you\" as that turns the conversation into a blame rather than sharing emotions. AS they practiced we used the anger model, breaking down the emotions Shahab was actually experiencing beneath the anger. She was able to show some vulnerability with her father and shared her fear of losing him, that if he could leave the person he said he loved then would he also leave her life. This all came up as he began to date and recently  and now has a 3 month old son that she has met.    Her father was able to see that while he thought he was clear he still loved her, she still felt very fragile and worried this relationship she was using for herself as the perfect model shook her confidence and trust. Both agreed to do weekly video chats as he lives in [place] with her brothers and new family. Also to set a time in the next few weeks where she can met her new brother.    Objective Observations:   Participation:Active verbal participation, Attentive, Engaged, and Open to feedback   Grooming:Casual and Neat   Cognition:Alert   Eye Contact:Good   Mood:Depressed and Anxious   Affect:Congruent with content   Thought Process:Goal-directed   Speech:Rate within normal limits and Volume within normal limits    Current Risk:   Suicide: not indicated   Homicide: not " indicated   Self-Harm: not indicated   Relapse: not indicated   Safety Plan Reviewed: not applicable    Care Plan Updated: No    Does patient express agreement with the above plan? Yes     Diagnosis:  No diagnosis found.    Referral appointment(s) scheduled?  Made prior        Clay Paul L.C.S.W.

## 2023-05-25 ENCOUNTER — OFFICE VISIT (OUTPATIENT)
Dept: URGENT CARE | Facility: PHYSICIAN GROUP | Age: 18
End: 2023-05-25
Payer: COMMERCIAL

## 2023-05-25 VITALS
SYSTOLIC BLOOD PRESSURE: 112 MMHG | DIASTOLIC BLOOD PRESSURE: 64 MMHG | HEART RATE: 91 BPM | HEIGHT: 67 IN | TEMPERATURE: 99 F | WEIGHT: 225 LBS | RESPIRATION RATE: 14 BRPM | BODY MASS INDEX: 35.31 KG/M2 | OXYGEN SATURATION: 98 %

## 2023-05-25 DIAGNOSIS — J02.9 SORE THROAT: ICD-10-CM

## 2023-05-25 DIAGNOSIS — J02.0 ACUTE STREPTOCOCCAL PHARYNGITIS: ICD-10-CM

## 2023-05-25 LAB
INT CON NEG: NEGATIVE
INT CON POS: POSITIVE
S PYO AG THROAT QL: POSITIVE

## 2023-05-25 PROCEDURE — 3074F SYST BP LT 130 MM HG: CPT | Performed by: NURSE PRACTITIONER

## 2023-05-25 PROCEDURE — 99213 OFFICE O/P EST LOW 20 MIN: CPT | Performed by: NURSE PRACTITIONER

## 2023-05-25 PROCEDURE — 87880 STREP A ASSAY W/OPTIC: CPT | Performed by: NURSE PRACTITIONER

## 2023-05-25 PROCEDURE — 3078F DIAST BP <80 MM HG: CPT | Performed by: NURSE PRACTITIONER

## 2023-05-25 RX ORDER — AMOXICILLIN 500 MG/1
500 CAPSULE ORAL 2 TIMES DAILY
Qty: 20 CAPSULE | Refills: 0 | Status: SHIPPED | OUTPATIENT
Start: 2023-05-25 | End: 2023-06-04

## 2023-05-25 ASSESSMENT — ENCOUNTER SYMPTOMS
MYALGIAS: 0
CONSTIPATION: 0
COUGH: 0
EYE REDNESS: 0
DIZZINESS: 0
SORE THROAT: 1
DIARRHEA: 0
VOMITING: 0
SHORTNESS OF BREATH: 0
HEADACHES: 0
ABDOMINAL PAIN: 0
FEVER: 0
NAUSEA: 0
WEAKNESS: 0
EYE DISCHARGE: 0
NECK PAIN: 0
WHEEZING: 0
CHILLS: 0

## 2023-05-25 ASSESSMENT — FIBROSIS 4 INDEX: FIB4 SCORE: 0.16

## 2023-05-25 NOTE — PROGRESS NOTES
Subjective     Dori WEAVER is a 17 y.o. female who presents with Sore Throat (Dysphagia, x 3 days)            HPI  States has been experiencing sore throat and pain with swallowing x3 days.  States possible exposure to strep from coworkers.  Over-the-counter ibuprofen as needed.  No salt water gargle.  No noted nausea, vomiting or abdominal pain.    PMH:  has a past medical history of Anxiety, Depression, and Ovarian cyst.  MEDS:   Current Outpatient Medications:     amoxicillin (AMOXIL) 500 MG Cap, Take 1 Capsule by mouth 2 times a day for 10 days., Disp: 20 Capsule, Rfl: 0    buPROPion (WELLBUTRIN XL) 300 MG XL tablet, Take 1 Tablet by mouth every morning for 180 days., Disp: 90 Tablet, Rfl: 1    venlafaxine (EFFEXOR) 75 MG Tab, Take 1 Tablet by mouth every day for 180 days., Disp: 90 Tablet, Rfl: 1    etonogestrel (NEXPLANON) 68 MG Implant implant, Inject  under the skin., Disp: , Rfl:   ALLERGIES:   Allergies   Allergen Reactions    Influenza Vaccine Live Hives     Hives to face and arms; fine with mist subsequently      SURGHX: History reviewed. No pertinent surgical history.  SOCHX:  reports that she has never smoked. She has never used smokeless tobacco. She reports that she does not currently use alcohol. She reports that she does not currently use drugs after having used the following drugs: Marijuana and Inhaled.  FH: Family history was reviewed, no pertinent findings to report    Review of Systems   Constitutional:  Negative for chills, fever and malaise/fatigue.   HENT:  Positive for sore throat. Negative for congestion and ear pain.    Eyes:  Negative for discharge and redness.   Respiratory:  Negative for cough, shortness of breath and wheezing.    Gastrointestinal:  Negative for abdominal pain, constipation, diarrhea, nausea and vomiting.   Musculoskeletal:  Negative for myalgias and neck pain.   Skin:  Negative for itching and rash.   Neurological:  Negative for dizziness, weakness and  "headaches.   Endo/Heme/Allergies:  Negative for environmental allergies.   All other systems reviewed and are negative.             Objective     /64   Pulse 91   Temp 37.2 °C (99 °F)   Resp 14   Ht 1.702 m (5' 7\")   Wt 102 kg (225 lb)   SpO2 98%   BMI 35.24 kg/m²      Physical Exam  Vitals reviewed.   Constitutional:       General: She is awake. She is not in acute distress.     Appearance: Normal appearance. She is well-developed. She is not ill-appearing, toxic-appearing or diaphoretic.   HENT:      Head: Normocephalic.      Mouth/Throat:      Lips: Pink.      Mouth: Mucous membranes are moist.      Pharynx: Uvula midline. Posterior oropharyngeal erythema present. No pharyngeal swelling, oropharyngeal exudate or uvula swelling.      Tonsils: No tonsillar exudate or tonsillar abscesses.   Eyes:      Conjunctiva/sclera: Conjunctivae normal.      Pupils: Pupils are equal, round, and reactive to light.   Cardiovascular:      Rate and Rhythm: Normal rate.   Pulmonary:      Effort: Pulmonary effort is normal.   Musculoskeletal:         General: Normal range of motion.      Cervical back: Normal range of motion and neck supple.   Skin:     General: Skin is warm and dry.   Neurological:      Mental Status: She is alert and oriented to person, place, and time.   Psychiatric:         Attention and Perception: Attention normal.         Mood and Affect: Mood normal.         Speech: Speech normal.         Behavior: Behavior normal. Behavior is cooperative.                             Assessment & Plan        1. Sore throat    - POCT Rapid Strep A    2. Acute streptococcal pharyngitis    - amoxicillin (AMOXIL) 500 MG Cap; Take 1 Capsule by mouth 2 times a day for 10 days.  Dispense: 20 Capsule; Refill: 0    -Maintain hydration/water intake  -May use over the counter Ibuprofen/Tylenol as needed for any fever, body aches or ear/throat pain  -May take long acting antihistamine (avoid decongestant forms) for any nasal " congestion/runny nose/post nasal drip symptoms as needed  -May use over the counter saline nasal spray for nasal congestion/post nasal drip as needed  -May use over the counter Nasacort/Flonase for nasal decongestion as needed   -May use throat lozenges for throat discomfort as needed   -Change toothbrush after 24 hrs of antibiotics, if prescribed  -May gargle with salt water up to 4x/day as needed for throat discomfort (1 tsp salt dissolved in 1 cup warm water)  -May drink smoothies/soft foods or warm liquids if too painful to swallow solid foods  -Monitor for any increased throat pain, difficulty swallowing/breathing or speaking, fever, ear pain- must be re-evaluated in urgent care or emergency room

## 2023-05-25 NOTE — LETTER
May 25, 2023       Patient: SHAHAB WEAVER   YOB: 2005   Date of Visit: 5/25/2023         To Whom It May Concern:    In my medical opinion, I recommend that SHAHAB WEAVER be excused from work today and tomorrow (5/26/2023) as she was evaluated in clinic.     If you have any questions or concerns, please don't hesitate to call [phone]          Sincerely,          Kim Chandra A.P.R.N.  Electronically Signed   )

## 2023-06-05 ENCOUNTER — GYNECOLOGY VISIT (OUTPATIENT)
Dept: OBGYN | Facility: CLINIC | Age: 18
End: 2023-06-05
Payer: COMMERCIAL

## 2023-06-05 ENCOUNTER — HOSPITAL ENCOUNTER (OUTPATIENT)
Facility: MEDICAL CENTER | Age: 18
End: 2023-06-05
Attending: OBSTETRICS & GYNECOLOGY
Payer: COMMERCIAL

## 2023-06-05 ENCOUNTER — PATIENT MESSAGE (OUTPATIENT)
Dept: MEDICAL GROUP | Facility: PHYSICIAN GROUP | Age: 18
End: 2023-06-05

## 2023-06-05 VITALS — WEIGHT: 229 LBS | BODY MASS INDEX: 35.87 KG/M2

## 2023-06-05 DIAGNOSIS — Z11.1 SCREENING-PULMONARY TB: ICD-10-CM

## 2023-06-05 DIAGNOSIS — Z11.3 SCREENING EXAMINATION FOR SEXUALLY TRANSMITTED DISEASE: ICD-10-CM

## 2023-06-05 DIAGNOSIS — Z01.419 WOMEN'S ANNUAL ROUTINE GYNECOLOGICAL EXAMINATION: ICD-10-CM

## 2023-06-05 PROCEDURE — 87591 N.GONORRHOEAE DNA AMP PROB: CPT

## 2023-06-05 PROCEDURE — 99385 PREV VISIT NEW AGE 18-39: CPT | Performed by: OBSTETRICS & GYNECOLOGY

## 2023-06-05 PROCEDURE — 87491 CHLMYD TRACH DNA AMP PROBE: CPT

## 2023-06-05 ASSESSMENT — FIBROSIS 4 INDEX: FIB4 SCORE: 0.17

## 2023-06-05 NOTE — PROGRESS NOTES
NP here today for Annual appt.  Phone # [phone] (home) [phone] (work)  C/o none  Has Nexplanon that needs to be switched out

## 2023-06-05 NOTE — PROGRESS NOTES
ANNUAL GYNECOLOGY VISIT    Chief Complaint  Gynecologic Exam    Subjective  SHAHAB WEAVER is a 18 y.o. female who presents today for Annual Exam.  She reports that she has been in good health for the last year. Her main concern is discussing contraception. She has a nexplanon and would like to switch to the pill. She declines migraine with aura, blood clots, and does not smoke.     Preventive Care   Immunization History   Administered Date(s) Administered    9VHPV VACCINE 2-3 DOSE IM (GARDASIL 9) 08/28/2015, 11/25/2015    DTaP/IPV/HepB Combined Vaccine 2003, 2003    Dtap Vaccine 2003, 09/02/2004, 06/07/2007    HIB Vaccine (ACTHIB/HIBERIX) 2005, 2005, 2003, 09/02/2004    HPV Quadrivalent Vaccine (GARDASIL) - HISTORICAL DATA 06/23/2014    Hepatitis A Vaccine, Ped/Adol 07/13/2005, 01/13/2006    Hepatitis B Vaccine Adolescent/Pediatric 2003, 07/13/2006, 08/17/2006, 12/28/2006    IPV 2003, 06/07/2007, 07/11/2008    Influenza (IM) Preservative Free - HISTORICAL DATA 11/07/2007, 10/27/2009, 10/03/2016    Influenza LAIV (Nasal) - HISTORICAL DATA 11/23/2010, 10/06/2011    Influenza Seasonal Injectable - Historical Data 10/29/2015    Influenza Vaccine H1N1 - HISTORICAL DATA 10/27/2009, 12/02/2009    Influenza Vaccine Pediatric Split - Historical Data 2003, 10/29/2004, 11/20/2006, 10/21/2008, 01/21/2013, 11/15/2013    Influenza Vaccine Quad Inj (Pf) 10/10/2018, 10/30/2019, 10/17/2020, 12/28/2022    Influenza, Unspecified - HISTORICAL DATA 11/10/2014    MMR Vaccine 09/02/2004    MMR/Varicella Combined Vaccine 07/11/2008    Meningococcal Conjugate Vaccine MCV4 (MENVEO) 06/23/2014, 08/29/2019    Meningococcal Conjugate Vaccine MCV4 (Menactra) 06/23/2014    PFIZER BIVALENT SARS-COV-2 VACCINE (12+) 12/28/2022    PFIZER PURPLE CAP SARS-COV-2 VACCINATION (12+) 03/28/2021, 04/18/2021, 12/23/2021    Pneumococcal Vaccine (PCV7) - HISTORICAL DATA 2003, 2003,  2003    Tdap Vaccine 2014    Varicella Vaccine Live 2005     Last Mammogram: not yet indicated     Gynecology History and ROS  Current Sexual Activity: Yes  History of sexually transmitted diseases?  no  Abnormal discharge? No  Current Contraception:  Nexplanon     Menstrual History  Patient's last menstrual period was 2023 (exact date).  Periods are irregular due tot he nexplanon     Pap History  Last pap smear: not yet indicated  History of moderate or severe dysplasia: No    Cancer Risk Assessement:  Family history of:   - Breast cancer: yes, maternal aunt    - Ovarian cancer: no   - Uterine cancer: no   - Colon cancer: no    Obstetric History  OB History    Para Term  AB Living   0 0 0 0 0 0   SAB IAB Ectopic Molar Multiple Live Births   0 0 0 0 0 0       Past Medical History  Past Medical History:   Diagnosis Date    Anxiety     Depression     Ovarian cyst        Past Surgical History  History reviewed. No pertinent surgical history.    Social History  Social History     Socioeconomic History    Marital status: Single     Spouse name: Not on file    Number of children: Not on file    Years of education: Not on file    Highest education level: Some college, no degree   Occupational History    Not on file   Tobacco Use    Smoking status: Never    Smokeless tobacco: Never   Vaping Use    Vaping Use: Never used   Substance and Sexual Activity    Alcohol use: Not Currently    Drug use: Not Currently     Types: Marijuana, Inhaled     Comment: marijuana    Sexual activity: Yes     Partners: Male     Birth control/protection: Implant   Other Topics Concern    Not on file   Social History Narrative    ** Merged History Encounter **          Social Determinants of Health     Financial Resource Strain: Low Risk  (2023)    Overall Financial Resource Strain (CARDIA)     Difficulty of Paying Living Expenses: Not very hard   Food Insecurity: Food Insecurity Present (2023)     Hunger Vital Sign     Worried About Running Out of Food in the Last Year: Sometimes true     Ran Out of Food in the Last Year: Sometimes true   Transportation Needs: No Transportation Needs (1/28/2023)    PRAPARE - Transportation     Lack of Transportation (Medical): No     Lack of Transportation (Non-Medical): No   Physical Activity: Sufficiently Active (1/28/2023)    Exercise Vital Sign     Days of Exercise per Week: 3 days     Minutes of Exercise per Session: 50 min   Stress: No Stress Concern Present (1/28/2023)    [place] [employer] - Occupational Stress Questionnaire     Feeling of Stress : Not at all   Social Connections: Moderately Isolated (1/28/2023)    Social Connection and Isolation Panel [NHANES]     Frequency of Communication with Friends and Family: More than three times a week     Frequency of Social Gatherings with Friends and Family: More than three times a week     Attends [employer] Services: 1 to 4 times per year     Active Member of Clubs or Organizations: No     Attends Club or Organization Meetings: Never     Marital Status: Never    Intimate Partner Violence: Not on file   Housing Stability: High Risk (1/28/2023)    Housing Stability Vital Sign     Unable to Pay for Housing in the Last Year: No     Number of Places Lived in the Last Year: 3     Unstable Housing in the Last Year: No       Family History  Family History   Problem Relation Age of Onset    Diabetes Mother     No Known Problems Father     Breast Cancer Maternal Aunt     Hypertension Maternal Grandfather        Home Medications  Current Outpatient Medications on File Prior to Visit   Medication Sig Dispense Refill    buPROPion (WELLBUTRIN XL) 300 MG XL tablet Take 1 Tablet by mouth every morning for 180 days. 90 Tablet 1    venlafaxine (EFFEXOR) 75 MG Tab Take 1 Tablet by mouth every day for 180 days. 90 Tablet 1    etonogestrel (NEXPLANON) 68 MG Implant implant Inject  under the skin.       No current  facility-administered medications on file prior to visit.       Allergies/Reactions  Allergies   Allergen Reactions    Influenza Vaccine Live Hives     Hives to face and arms; fine with mist subsequently        ROS  Positive ROS: no  Gen: no fevers or chills, no significant weight loss or gain, excessive fatigue  Respiratory:  no cough or dyspnea  Cardiac:  no chest pain, no palpitations, no syncope  Breast: no breast discharge, pain, lump or skin changes  GI:  no heartburn, no abdominal pain, no nausea or vomiting  Urinary: no dysuria, urgency, frequency, incontinence   Psych: no depression or anxiety  Neuro: no migraines with aura, fainting spells, numbness or tingling  Extremities: no joint pain, persistently swollen ankles, recurrent leg cramps      Physical Examination:  Vital Signs:   Vitals:    06/05/23 0805   Weight: 229 lb     Body mass index is 35.87 kg/m².    Constitutional: The patient is well developed and well nourished.  Psychiatric: Patient is oriented to time place and person.   Skin: No rash observed.  Neck: Appears symmetric. Thyroid normal size  Respiratory: normal effort  Breast: Declined  Abdomen: Soft, non-tender.  Pelvic Exam: Declined  Pap Smear performed: No  Extremeties: Legs are symmetric and without tenderness. There is no edema present.    Labs/Imaging:  HDL (mg/dL)   Date Value   02/21/2023 37 (A)     LDL (mg/dL)   Date Value   02/21/2023 127 (H)     No components found for: A1C1  WBC (K/uL)   Date Value   02/21/2023 9.1     Lab Results   Component Value Date/Time    CO2 25 02/21/2023 0628    BUN 10 02/21/2023 0628     Assessment & Plan  SHAHAB WEAVER is a 18 y.o. female who presents today for Annual Gyn Exam.     1. Women's annual routine gynecological examination  - The patient declines a pelvic and breast exam today  - Discussed annual G/C screening and she prefers to self swab  - Has family history of breast cancer and has had negative BRCA testing  - Counseled that she will  be due to start pap smears next year    2. Screening examination for sexually transmitted disease  - Chlamydia/GC, PCR (Genital/Anal swab); Future    3. Contraception  - Would like nexplanon removed, and would like to try OCPs instead  - Will schedule her for follow up for this procedure     Return: Annually or PRN    Kayla Hoff M.D.

## 2023-06-06 LAB
C TRACH DNA GENITAL QL NAA+PROBE: NEGATIVE
N GONORRHOEA DNA GENITAL QL NAA+PROBE: NEGATIVE
SPECIMEN SOURCE: NORMAL

## 2023-06-13 ENCOUNTER — HOSPITAL ENCOUNTER (OUTPATIENT)
Dept: LAB | Facility: MEDICAL CENTER | Age: 18
End: 2023-06-13
Payer: COMMERCIAL

## 2023-06-13 DIAGNOSIS — Z11.1 SCREENING-PULMONARY TB: ICD-10-CM

## 2023-06-13 PROCEDURE — 36415 COLL VENOUS BLD VENIPUNCTURE: CPT

## 2023-06-13 PROCEDURE — 86480 TB TEST CELL IMMUN MEASURE: CPT

## 2023-06-14 LAB
GAMMA INTERFERON BACKGROUND BLD IA-ACNC: 0.02 IU/ML
M TB IFN-G BLD-IMP: NEGATIVE
M TB IFN-G CD4+ BCKGRND COR BLD-ACNC: 0 IU/ML
MITOGEN IGNF BCKGRD COR BLD-ACNC: >10 IU/ML
QFT TB2 - NIL TBQ2: 0 IU/ML

## 2023-06-15 ENCOUNTER — GYNECOLOGY VISIT (OUTPATIENT)
Dept: OBGYN | Facility: CLINIC | Age: 18
End: 2023-06-15
Payer: COMMERCIAL

## 2023-06-15 VITALS — BODY MASS INDEX: 35.71 KG/M2 | WEIGHT: 228 LBS

## 2023-06-15 DIAGNOSIS — Z30.017 NEXPLANON INSERTION: ICD-10-CM

## 2023-06-15 DIAGNOSIS — Z30.46 NEXPLANON REMOVAL: ICD-10-CM

## 2023-06-15 PROCEDURE — 11983 REMOVE/INSERT DRUG IMPLANT: CPT | Performed by: OBSTETRICS & GYNECOLOGY

## 2023-06-15 ASSESSMENT — ENCOUNTER SYMPTOMS
CONSTITUTIONAL NEGATIVE: 1
GASTROINTESTINAL NEGATIVE: 1
DEPRESSION: 1
MUSCULOSKELETAL NEGATIVE: 1
NERVOUS/ANXIOUS: 1
HEADACHES: 1
RESPIRATORY NEGATIVE: 1
CARDIOVASCULAR NEGATIVE: 1
EYES NEGATIVE: 1

## 2023-06-15 ASSESSMENT — FIBROSIS 4 INDEX: FIB4 SCORE: 0.17

## 2023-06-15 NOTE — PROCEDURES
Nexplanon Replacement Procedure Note    Patient presents for removal of Nexplanon, has had in place for 3 years and desires a new nexplanon.  R/b/a discussed with patient, including pain during placement, bleeding, and bruising.  Procedure discussed at length with patient, including use of local anesthetic.  Implant is in the patient's left upper arm.      The patient was positioned on the examination table with her left arm flexed at the elbow and externally rotated so that her wrist was parallel to her ear.  The Nexplanon was easily palpated along the medial aspect of the left upper arm and felt to be superficial.  The skin over the distal end of the implant was cleansed with betadine and 3mL of 1% lidocaine with epinephrine was injected subcutaneously.      A small incision was made and the implant was removed with mosquito forceps. The nexplanon device was then removed from the package and the protective covering was removed from the device.  The white colored implant was verified in the device.  The skin was punctured with the device at 30 degrees at the previously identified insertion site.  The applicator was then lowered to the horizontal position and the needle was then inserted to its full length.  The slider was then pulled back fully and the implant was released.  Presence of the implant was verified by both myself and the patient.  Minimal blood loss.  A bandage was placed over the insertion site and kerlex wrap was applied.  Patient tolerated the procedure well.     Nexplanon implant information was collected and is to be scanned into the patient's electronic medical record.    Kayla Hoff M.D.

## 2023-06-15 NOTE — PROGRESS NOTES
GYN PROBLEM VISIT    CC:  No chief complaint on file.    HPI: Patient is a 18 y.o.  who presents for contraception management. She has a nexplanon that is due to be replaced. She was considering switching to OCPs. After discussing her health history it is questionable if she has migraine with aura as she describes migraines with vision changes. After counseling, she elected to have her nexplanon replaced.     ROS:   Review of Systems   Constitutional: Negative.    HENT: Negative.     Eyes: Negative.    Respiratory: Negative.     Cardiovascular: Negative.    Gastrointestinal: Negative.    Genitourinary: Negative.    Musculoskeletal: Negative.    Skin: Negative.    Neurological:  Positive for headaches.   Endo/Heme/Allergies: Negative.    Psychiatric/Behavioral:  Positive for depression. The patient is nervous/anxious.          PFSH:  I personally reviewed the past medical and surgical histories.     Social History     Tobacco Use    Smoking status: Never    Smokeless tobacco: Never   Vaping Use    Vaping Use: Never used   Substance Use Topics    Alcohol use: Not Currently    Drug use: Not Currently     Types: Marijuana, Inhaled     Comment: marijuana       Social History     Substance and Sexual Activity   Sexual Activity Yes    Partners: Male    Birth control/protection: Implant        ALLERGIES / REACTIONS:  Allergies   Allergen Reactions    Influenza Vaccine Live Hives     Hives to face and arms; fine with mist subsequently                            PHYSICAL EXAMINATION:  Vital Signs:   Vitals:    06/15/23 1527   Weight: 228 lb     Body mass index is 35.71 kg/m².    Physical Exam  Vitals reviewed.   Constitutional:       Appearance: Normal appearance.   HENT:      Head: Normocephalic.   Eyes:      Extraocular Movements: Extraocular movements intact.      Pupils: Pupils are equal, round, and reactive to light.   Cardiovascular:      Rate and Rhythm: Normal rate.   Pulmonary:      Effort: Pulmonary effort is  normal.   Abdominal:      General: Abdomen is flat.      Palpations: Abdomen is soft.   Musculoskeletal:         General: Normal range of motion.      Cervical back: Normal range of motion.      Comments: Nexplanon in left arm. See procedure note for details   Skin:     General: Skin is warm and dry.   Neurological:      General: No focal deficit present.      Mental Status: She is alert and oriented to person, place, and time.   Psychiatric:         Mood and Affect: Mood normal.         Behavior: Behavior normal.          ASSESSMENT AND PLAN:  18 y.o.    1. Nexplanon removal  - Consent for all Surgical, Special Diagnostic or Therapeutic Procedures    2. Nexplanon insertion  - etonogestrel (NEXPLANON) implant 1 Each    Plan:  Nexplanon was replaced today without complication  Return to clinic in one year for annual exam and pap smear     Kayla oHff M.D.  Obstetrics & Gynecology   [id number]  4:17 PM

## 2023-06-19 ENCOUNTER — OFFICE VISIT (OUTPATIENT)
Dept: URGENT CARE | Facility: PHYSICIAN GROUP | Age: 18
End: 2023-06-19
Payer: COMMERCIAL

## 2023-06-19 VITALS
TEMPERATURE: 98.4 F | SYSTOLIC BLOOD PRESSURE: 110 MMHG | RESPIRATION RATE: 18 BRPM | OXYGEN SATURATION: 100 % | HEART RATE: 95 BPM | BODY MASS INDEX: 34.75 KG/M2 | HEIGHT: 68 IN | DIASTOLIC BLOOD PRESSURE: 60 MMHG | WEIGHT: 229.28 LBS

## 2023-06-19 DIAGNOSIS — R09.81 NASAL CONGESTION: ICD-10-CM

## 2023-06-19 DIAGNOSIS — R06.02 SOB (SHORTNESS OF BREATH): ICD-10-CM

## 2023-06-19 DIAGNOSIS — J02.9 SORE THROAT: ICD-10-CM

## 2023-06-19 DIAGNOSIS — R59.1 LYMPHADENOPATHY: ICD-10-CM

## 2023-06-19 DIAGNOSIS — R05.1 ACUTE COUGH: ICD-10-CM

## 2023-06-19 LAB — S PYO DNA SPEC NAA+PROBE: NOT DETECTED

## 2023-06-19 PROCEDURE — 87651 STREP A DNA AMP PROBE: CPT

## 2023-06-19 PROCEDURE — 3074F SYST BP LT 130 MM HG: CPT

## 2023-06-19 PROCEDURE — 3078F DIAST BP <80 MM HG: CPT

## 2023-06-19 PROCEDURE — 99213 OFFICE O/P EST LOW 20 MIN: CPT | Mod: 25

## 2023-06-19 RX ORDER — LIDOCAINE HYDROCHLORIDE 20 MG/ML
5 SOLUTION OROPHARYNGEAL PRN
Qty: 100 ML | Refills: 0 | Status: SHIPPED | OUTPATIENT
Start: 2023-06-19 | End: 2023-08-18

## 2023-06-19 RX ORDER — BENZONATATE 100 MG/1
100 CAPSULE ORAL 3 TIMES DAILY PRN
Qty: 60 CAPSULE | Refills: 0 | Status: SHIPPED
Start: 2023-06-19 | End: 2023-08-18

## 2023-06-19 RX ORDER — DEXAMETHASONE SODIUM PHOSPHATE 10 MG/ML
10 INJECTION INTRAMUSCULAR; INTRAVENOUS ONCE
Status: COMPLETED | OUTPATIENT
Start: 2023-06-19 | End: 2023-06-19

## 2023-06-19 RX ORDER — ALBUTEROL SULFATE 90 UG/1
2 AEROSOL, METERED RESPIRATORY (INHALATION) EVERY 6 HOURS PRN
Qty: 8.5 G | Refills: 0 | Status: SHIPPED | OUTPATIENT
Start: 2023-06-19 | End: 2024-03-04

## 2023-06-19 RX ADMIN — DEXAMETHASONE SODIUM PHOSPHATE 10 MG: 10 INJECTION INTRAMUSCULAR; INTRAVENOUS at 09:49

## 2023-06-19 ASSESSMENT — ENCOUNTER SYMPTOMS
CARDIOVASCULAR NEGATIVE: 1
SINUS PAIN: 0
VOMITING: 0
NEUROLOGICAL NEGATIVE: 1
HEADACHES: 0
WEIGHT LOSS: 0
CHILLS: 0
COUGH: 1
NECK PAIN: 1
EYES NEGATIVE: 1
TROUBLE SWALLOWING: 1
SORE THROAT: 1
SHORTNESS OF BREATH: 1
FEVER: 0

## 2023-06-19 ASSESSMENT — FIBROSIS 4 INDEX: FIB4 SCORE: 0.17

## 2023-06-19 NOTE — LETTER
June 19, 2023         Patient: Dori Martinez   YOB: 2005   Date of Visit: 6/19/2023           To Whom it May Concern:    Dori Martinez was seen in my clinic on 6/19/2023. Please excuse any absences this week due to an acute illness. She may return to work sooner if she is feeling better.     If you have any questions or concerns, please don't hesitate to call.        Sincerely,           ELIZABETH Evans.[email or web address].  Electronically Signed

## 2023-06-19 NOTE — PROGRESS NOTES
"Subjective     Dori Martinez is a 18 y.o. female who presents with Pharyngitis (X 1 days sore throat, hurts to swallow, nose really stuffy)            Pharyngitis   This is a new problem. The current episode started yesterday. The problem has been gradually worsening. Sore throat worse side: whoel throat. There has been no fever. The pain is at a severity of 7/10. The pain is moderate. Associated symptoms include congestion, coughing, ear pain, a plugged ear sensation, neck pain, shortness of breath and trouble swallowing. Pertinent negatives include no headaches or vomiting. Associated symptoms comments: Right ear . She has had no exposure to strep or mono. She has tried acetaminophen, NSAIDs and cool liquids for the symptoms. The treatment provided no relief.       Review of Systems   Constitutional:  Positive for malaise/fatigue. Negative for chills, fever and weight loss.   HENT:  Positive for congestion, ear pain, sore throat and trouble swallowing. Negative for sinus pain.    Eyes: Negative.    Respiratory:  Positive for cough and shortness of breath.    Cardiovascular: Negative.    Gastrointestinal:  Negative for vomiting.   Genitourinary: Negative.    Musculoskeletal:  Positive for neck pain.   Neurological: Negative.  Negative for headaches.              Objective     /60   Pulse 95   Temp 36.9 °C (98.4 °F) (Temporal)   Resp 18   Ht 1.727 m (5' 8\")   Wt 104 kg (229 lb 4.5 oz)   LMP 04/02/2023 (Exact Date)   SpO2 100%   BMI 34.86 kg/m²      Physical Exam  Constitutional:       Appearance: Normal appearance. She is not ill-appearing.   HENT:      Head: Normocephalic and atraumatic.      Right Ear: Tympanic membrane and external ear normal.      Left Ear: Tympanic membrane and external ear normal.      Nose: Congestion present.      Mouth/Throat:      Mouth: Mucous membranes are moist.      Pharynx: Uvula midline. Posterior oropharyngeal erythema present. No pharyngeal swelling, " oropharyngeal exudate or uvula swelling.      Tonsils: No tonsillar exudate or tonsillar abscesses. 1+ on the right. 1+ on the left.   Eyes:      Pupils: Pupils are equal, round, and reactive to light.   Cardiovascular:      Rate and Rhythm: Normal rate.   Pulmonary:      Effort: Pulmonary effort is normal.      Breath sounds: Normal breath sounds. No stridor. No wheezing or rhonchi.   Abdominal:      Palpations: Abdomen is soft.   Musculoskeletal:         General: Normal range of motion.      Cervical back: Normal range of motion and neck supple.   Lymphadenopathy:      Head:      Right side of head: Tonsillar adenopathy present. No submental, submandibular, preauricular, posterior auricular or occipital adenopathy.      Left side of head: Tonsillar adenopathy present. No submental, submandibular, preauricular, posterior auricular or occipital adenopathy.      Cervical: Cervical adenopathy present.      Right cervical: Superficial cervical adenopathy present. No deep or posterior cervical adenopathy.     Left cervical: Superficial cervical adenopathy present. No deep or posterior cervical adenopathy.      Upper Body:      Right upper body: No supraclavicular adenopathy.      Left upper body: No supraclavicular adenopathy.   Skin:     General: Skin is warm and dry.   Neurological:      Mental Status: She is alert and oriented to person, place, and time.                           Assessment & Plan        Patient presents today due to increasing sore throat, she says it has begun to be painful to swallow.   She does have associated congestion, plugged ear sensation on the right with slight ear pain, states she does have some shortness of breath with exertion.    Exam shows erythema to posterior pharynx, no exudates, uvula midline, no edema.  Tonsils +1 bilaterally.  She does have nasal congestion.  Bilateral tympanic membranes pearly white bony landmarks visible.  Discussed strep PCR in clinic today.  Results will be  visible on MyChart.  Educated that amoxicillin will be called into patient's pharmacy of choice if tested positive today.  Educated on need to stay home from work for 24 hours on antibiotics and dispose of toothbrush after 24 hours on antibiotics.  Discussed etiology of viral URI, normal course of illness.  Discussed OTC Tylenol/ibuprofen for symptom management, warm salt water gargles.  Differential diagnosis, natural history, supportive care, and indications for immediate follow-up were discussed.       1. Acute cough  - benzonatate (TESSALON) 100 MG Cap; Take 1 Capsule by mouth 3 times a day as needed for Cough.  Dispense: 60 Capsule; Refill: 0  - dexamethasone (DECADRON) injection (check route below) 10 mg    2. Sore throat  - POCT CEPHEID GROUP A STREP - PCR  - dexamethasone (DECADRON) injection (check route below) 10 mg  - lidocaine (XYLOCAINE) 2 % Solution; Take 5 mL by mouth as needed for Throat/Mouth Pain.  Dispense: 100 mL; Refill: 0    3. Lymphadenopathy    4. Nasal congestion    5. SOB (shortness of breath)  - albuterol 108 (90 Base) MCG/ACT Aero Soln inhalation aerosol; Inhale 2 Puffs every 6 hours as needed for Shortness of Breath.  Dispense: 8.5 g; Refill: 0  - dexamethasone (DECADRON) injection (check route below) 10 mg      Strep PCR negative-pt notified via my chart

## 2023-07-26 ENCOUNTER — APPOINTMENT (OUTPATIENT)
Dept: BEHAVIORAL HEALTH | Facility: CLINIC | Age: 18
End: 2023-07-26
Payer: COMMERCIAL

## 2023-08-18 ENCOUNTER — OFFICE VISIT (OUTPATIENT)
Dept: BEHAVIORAL HEALTH | Facility: CLINIC | Age: 18
End: 2023-08-18
Payer: COMMERCIAL

## 2023-08-18 DIAGNOSIS — F41.1 GENERALIZED ANXIETY DISORDER: Chronic | ICD-10-CM

## 2023-08-18 DIAGNOSIS — F33.42 RECURRENT MAJOR DEPRESSIVE DISORDER, IN FULL REMISSION (HCC): ICD-10-CM

## 2023-08-18 PROCEDURE — 99999 PR NO CHARGE: CPT | Performed by: STUDENT IN AN ORGANIZED HEALTH CARE EDUCATION/TRAINING PROGRAM

## 2023-08-18 RX ORDER — BUPROPION HYDROCHLORIDE 300 MG/1
300 TABLET ORAL EVERY MORNING
Qty: 90 TABLET | Refills: 1 | Status: SHIPPED | OUTPATIENT
Start: 2023-08-18 | End: 2024-02-14

## 2023-08-18 RX ORDER — VENLAFAXINE 75 MG/1
75 TABLET ORAL DAILY
Qty: 90 TABLET | Refills: 1 | Status: SHIPPED | OUTPATIENT
Start: 2023-08-18 | End: 2024-02-14

## 2023-08-18 NOTE — ADDENDUM NOTE
Addended by: CALEB HANDY on: 8/18/2023 09:03 AM     Modules accepted: Level of Service     Quality 138: Melanoma: Coordination Of Care: A treatment plan was communicated to the physicians providing continuing care within one month of diagnosis outlining: diagnosis, tumor thickness and a plan for surgery or alternate care. Quality 137: Melanoma: Continuity Of Care - Recall System: Patient information entered into a recall system that includes: target date for the next exam specified AND a process to follow up with patients regarding missed or unscheduled appointments Quality 130: Documentation Of Current Medications In The Medical Record: Current Medications Documented Detail Level: Detailed
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
## 2023-08-29 ENCOUNTER — OFFICE VISIT (OUTPATIENT)
Dept: BEHAVIORAL HEALTH | Facility: CLINIC | Age: 18
End: 2023-08-29
Payer: COMMERCIAL

## 2023-08-29 DIAGNOSIS — F41.1 GENERALIZED ANXIETY DISORDER: ICD-10-CM

## 2023-08-29 PROCEDURE — 90834 PSYTX W PT 45 MINUTES: CPT | Performed by: SOCIAL WORKER
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
## 2023-09-19 ENCOUNTER — TELEPHONE (OUTPATIENT)
Dept: MEDICAL GROUP | Facility: PHYSICIAN GROUP | Age: 18
End: 2023-09-19

## 2023-09-19 ENCOUNTER — NON-PROVIDER VISIT (OUTPATIENT)
Dept: MEDICAL GROUP | Facility: PHYSICIAN GROUP | Age: 18
End: 2023-09-19
Payer: COMMERCIAL

## 2023-09-19 DIAGNOSIS — Z23 NEED FOR VACCINATION: ICD-10-CM

## 2023-09-19 DIAGNOSIS — Z23 NEED FOR PROPHYLACTIC VACCINATION WITH STREPTOCOCCUS PNEUMONIAE (PNEUMOCOCCUS) AND INFLUENZA VACCINES: Primary | ICD-10-CM

## 2023-09-19 PROCEDURE — 90471 IMMUNIZATION ADMIN: CPT | Performed by: STUDENT IN AN ORGANIZED HEALTH CARE EDUCATION/TRAINING PROGRAM

## 2023-09-19 PROCEDURE — 90686 IIV4 VACC NO PRSV 0.5 ML IM: CPT | Performed by: STUDENT IN AN ORGANIZED HEALTH CARE EDUCATION/TRAINING PROGRAM

## 2023-09-19 NOTE — PROGRESS NOTES
"Dori Martinez is a 18 y.o. female here for a non-provider visit for:   FLU    Reason for immunization: Annual Flu Vaccine  Immunization records indicate need for vaccine: Yes, confirmed with Epic  Minimum interval has been met for this vaccine: Yes  ABN completed: Yes    VIS Dated  08/06201 was given to patient: Yes  All IAC Questionnaire questions were answered \"No.\"    Patient tolerated injection and no adverse effects were observed or reported: Yes    Pt scheduled for next dose in series: No  "

## 2023-09-19 NOTE — TELEPHONE ENCOUNTER
Patient is on the MA Schedule today for Flu  vaccine/injection.    SPECIFIC Action To Be Taken: Orders pending, please sign.

## 2023-10-07 ENCOUNTER — APPOINTMENT (OUTPATIENT)
Dept: URGENT CARE | Facility: PHYSICIAN GROUP | Age: 18
End: 2023-10-07
Payer: COMMERCIAL

## 2023-10-20 ENCOUNTER — APPOINTMENT (OUTPATIENT)
Dept: BEHAVIORAL HEALTH | Facility: CLINIC | Age: 18
End: 2023-10-20
Payer: COMMERCIAL

## 2023-11-15 ENCOUNTER — APPOINTMENT (OUTPATIENT)
Dept: URGENT CARE | Facility: PHYSICIAN GROUP | Age: 18
End: 2023-11-15
Payer: COMMERCIAL

## 2023-11-17 ENCOUNTER — APPOINTMENT (OUTPATIENT)
Dept: BEHAVIORAL HEALTH | Facility: CLINIC | Age: 18
End: 2023-11-17
Payer: COMMERCIAL

## 2023-11-21 ENCOUNTER — APPOINTMENT (OUTPATIENT)
Dept: BEHAVIORAL HEALTH | Facility: CLINIC | Age: 18
End: 2023-11-21
Payer: COMMERCIAL

## 2023-11-22 ENCOUNTER — APPOINTMENT (OUTPATIENT)
Dept: BEHAVIORAL HEALTH | Facility: CLINIC | Age: 18
End: 2023-11-22
Payer: COMMERCIAL

## 2023-12-06 ENCOUNTER — OFFICE VISIT (OUTPATIENT)
Dept: BEHAVIORAL HEALTH | Facility: CLINIC | Age: 18
End: 2023-12-06
Payer: COMMERCIAL

## 2023-12-06 DIAGNOSIS — F41.1 GENERALIZED ANXIETY DISORDER: ICD-10-CM

## 2023-12-06 PROCEDURE — 90832 PSYTX W PT 30 MINUTES: CPT

## 2023-12-12 ENCOUNTER — APPOINTMENT (OUTPATIENT)
Dept: BEHAVIORAL HEALTH | Facility: CLINIC | Age: 18
End: 2023-12-12
Payer: COMMERCIAL

## 2024-01-08 ENCOUNTER — APPOINTMENT (OUTPATIENT)
Dept: BEHAVIORAL HEALTH | Facility: CLINIC | Age: 19
End: 2024-01-08
Payer: COMMERCIAL

## 2024-01-10 ENCOUNTER — OFFICE VISIT (OUTPATIENT)
Dept: URGENT CARE | Facility: PHYSICIAN GROUP | Age: 19
End: 2024-01-10
Payer: COMMERCIAL

## 2024-01-10 VITALS
RESPIRATION RATE: 15 BRPM | DIASTOLIC BLOOD PRESSURE: 72 MMHG | SYSTOLIC BLOOD PRESSURE: 110 MMHG | HEART RATE: 95 BPM | WEIGHT: 228.95 LBS | OXYGEN SATURATION: 97 % | BODY MASS INDEX: 34.7 KG/M2 | HEIGHT: 68 IN | TEMPERATURE: 97.7 F

## 2024-01-10 DIAGNOSIS — H92.01 OTALGIA OF RIGHT EAR: ICD-10-CM

## 2024-01-10 PROCEDURE — 3078F DIAST BP <80 MM HG: CPT | Performed by: PHYSICIAN ASSISTANT

## 2024-01-10 PROCEDURE — 99213 OFFICE O/P EST LOW 20 MIN: CPT | Performed by: PHYSICIAN ASSISTANT

## 2024-01-10 PROCEDURE — 3074F SYST BP LT 130 MM HG: CPT | Performed by: PHYSICIAN ASSISTANT

## 2024-01-10 ASSESSMENT — ENCOUNTER SYMPTOMS
NAUSEA: 0
VOMITING: 0
SORE THROAT: 0
DIZZINESS: 0
FEVER: 0
CHILLS: 0

## 2024-01-10 ASSESSMENT — FIBROSIS 4 INDEX: FIB4 SCORE: 0.17

## 2024-01-10 NOTE — LETTER
January 10, 2024         Patient: Dori Martinez   YOB: 2005   Date of Visit: 1/10/2024           To Whom it May Concern:    Dori Martinez was seen in my clinic on 1/10/2024.  Please excuse her absence from work today.      Sincerely,           Kiersten Sam P.A.-C.  Electronically Signed

## 2024-01-10 NOTE — PROGRESS NOTES
"Subjective     Dori Martinez is a 18 y.o. female who presents with Otalgia (Right ear has been bothering me all night and I haven't slept because of it )    HPI:  Dori Martinez is a 18 y.o. female who presents today for evaluation of right ear pain.  Patient states that she started to notice some pain in her right ear while she was eating dinner last night.  She says that she felt something \"pop\" and started to have dull achy pain that turned to sharp.  She notes that she was even having trouble sleeping last night because of the pain.  She has tried taking ibuprofen and using eardrops.  She says nothing has helped.  Pain only on the right side.  She notes that she has had some nasal congestion for the past few weeks but no significant new URI symptoms.  No sore throat.  No fever.        Review of Systems   Constitutional:  Negative for chills and fever.   HENT:  Positive for congestion and ear pain. Negative for ear discharge and sore throat.    Gastrointestinal:  Negative for nausea and vomiting.   Neurological:  Negative for dizziness.                 PMH:  has a past medical history of Anxiety, Depression, Ovarian cyst, Panic disorder, Self-injurious behavior, and Suicide attempt ([facility]).    She has no past medical history of Alcohol abuse, Bipolar disorder ([facility]), Psychosis ([facility]), PTSD (post-traumatic stress disorder), Seizure ([facility]), or Substance abuse ([facility]).  MEDS:   Current Outpatient Medications:     buPROPion (WELLBUTRIN XL) 300 MG XL tablet, Take 1 Tablet by mouth every morning for 180 days. Indications: Major Depressive Disorder, Disp: 90 Tablet, Rfl: 1    venlafaxine (EFFEXOR) 75 MG Tab, Take 1 Tablet by mouth every day for 180 days. Indications: Major Depressive Disorder, Generalized Anxiety Disorder, Disp: 90 Tablet, Rfl: 1    etonogestrel (NEXPLANON) 68 MG Implant implant, Inject  under the skin., Disp: , Rfl:     albuterol 108 (90 Base) MCG/ACT Aero Soln inhalation aerosol, Inhale 2 " "Puffs every 6 hours as needed for Shortness of Breath., Disp: 8.5 g, Rfl: 0  ALLERGIES:   Allergies   Allergen Reactions    Influenza Vaccine Live Hives     Hives to face and arms; fine with mist subsequently      SURGHX: No past surgical history on file.  SOCHX:  reports that she has never smoked. She has never used smokeless tobacco. She reports that she does not currently use alcohol. She reports that she does not currently use drugs after having used the following drugs: Marijuana and Inhaled.  FH: Family history was reviewed, no pertinent findings to report        Objective     /72   Pulse 95   Temp 36.5 °C (97.7 °F) (Temporal)   Resp 15   Ht 1.727 m (5' 8\")   Wt 104 kg (228 lb 15.2 oz)   SpO2 97%   BMI 34.81 kg/m²      Physical Exam  Constitutional:       Appearance: She is well-developed.   HENT:      Head: Normocephalic and atraumatic.      Right Ear: Tympanic membrane, ear canal and external ear normal. No tenderness.      Left Ear: Tympanic membrane, ear canal and external ear normal. No tenderness.      Nose: Mucosal edema and congestion present. No rhinorrhea.      Mouth/Throat:      Lips: Pink.      Mouth: Mucous membranes are moist.      Pharynx: Oropharynx is clear.   Eyes:      Conjunctiva/sclera: Conjunctivae normal.      Pupils: Pupils are equal, round, and reactive to light.   Cardiovascular:      Rate and Rhythm: Normal rate and regular rhythm.   Pulmonary:      Effort: Pulmonary effort is normal.   Musculoskeletal:      Cervical back: Normal range of motion.   Lymphadenopathy:      Cervical: No cervical adenopathy.   Skin:     General: Skin is warm and dry.      Capillary Refill: Capillary refill takes less than 2 seconds.   Neurological:      Mental Status: She is alert and oriented to person, place, and time.   Psychiatric:         Behavior: Behavior normal.         Judgment: Judgment normal.           Assessment & Plan     1. Otalgia of right ear  No evidence of ear infection on " today's exam.  Symptoms likely secondary to eustachian tube dysfunction.  Recommend continued use of OTC analgesics as needed.  Also discussed trial of 7 to 14 days of OTC antihistamine such as Zyrtec or Claritin once daily as well as OTC Flonase nasal spray once daily.  She may also use an oral decongestant such as Sudafed as needed.  Patient            Differential Diagnosis, natural history, and supportive care discussed. Return to the Urgent Care or follow up with your PCP if symptoms fail to resolve, or for any new or worsening symptoms. Emergency room precautions discussed. Patient and/or family appears understanding of information.

## 2024-01-31 ENCOUNTER — APPOINTMENT (OUTPATIENT)
Dept: MEDICAL GROUP | Facility: PHYSICIAN GROUP | Age: 19
End: 2024-01-31
Payer: COMMERCIAL

## 2024-02-05 ENCOUNTER — APPOINTMENT (OUTPATIENT)
Dept: MEDICAL GROUP | Facility: PHYSICIAN GROUP | Age: 19
End: 2024-02-05
Payer: COMMERCIAL

## 2024-02-12 ENCOUNTER — OFFICE VISIT (OUTPATIENT)
Dept: BEHAVIORAL HEALTH | Facility: CLINIC | Age: 19
End: 2024-02-12
Payer: COMMERCIAL

## 2024-02-12 DIAGNOSIS — F33.42 RECURRENT MAJOR DEPRESSIVE DISORDER, IN FULL REMISSION (HCC): ICD-10-CM

## 2024-02-12 DIAGNOSIS — F41.1 GENERALIZED ANXIETY DISORDER: ICD-10-CM

## 2024-02-12 PROCEDURE — 90834 PSYTX W PT 45 MINUTES: CPT
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
## 2024-03-04 ENCOUNTER — OFFICE VISIT (OUTPATIENT)
Dept: BEHAVIORAL HEALTH | Facility: CLINIC | Age: 19
End: 2024-03-04
Payer: COMMERCIAL

## 2024-03-04 DIAGNOSIS — F33.42 RECURRENT MAJOR DEPRESSIVE DISORDER, IN FULL REMISSION (HCC): ICD-10-CM

## 2024-03-04 DIAGNOSIS — F41.1 GENERALIZED ANXIETY DISORDER: ICD-10-CM

## 2024-03-04 PROBLEM — F41.0 PANIC ATTACK: Status: RESOLVED | Noted: 2020-09-14 | Resolved: 2024-03-04

## 2024-03-04 RX ORDER — BUPROPION HYDROCHLORIDE 300 MG/1
300 TABLET ORAL EVERY MORNING
COMMUNITY
Start: 2023-10-01 | End: 2024-03-04 | Stop reason: SDUPTHER

## 2024-03-04 RX ORDER — VENLAFAXINE HYDROCHLORIDE 75 MG/1
75 CAPSULE, EXTENDED RELEASE ORAL DAILY
COMMUNITY
Start: 2022-10-01 | End: 2024-03-04 | Stop reason: SDUPTHER

## 2024-03-04 RX ORDER — VENLAFAXINE HYDROCHLORIDE 75 MG/1
75 CAPSULE, EXTENDED RELEASE ORAL DAILY
Qty: 90 CAPSULE | Refills: 1 | Status: SHIPPED | OUTPATIENT
Start: 2024-03-04 | End: 2024-08-31

## 2024-03-04 RX ORDER — BUPROPION HYDROCHLORIDE 300 MG/1
300 TABLET ORAL EVERY MORNING
Qty: 90 TABLET | Refills: 1 | Status: SHIPPED | OUTPATIENT
Start: 2024-03-04 | End: 2024-08-31

## 2024-03-04 ASSESSMENT — ANXIETY QUESTIONNAIRES
2. NOT BEING ABLE TO STOP OR CONTROL WORRYING: NOT AT ALL
3. WORRYING TOO MUCH ABOUT DIFFERENT THINGS: NOT AT ALL
7. FEELING AFRAID AS IF SOMETHING AWFUL MIGHT HAPPEN: NOT AT ALL
4. TROUBLE RELAXING: SEVERAL DAYS
GAD7 TOTAL SCORE: 4
1. FEELING NERVOUS, ANXIOUS, OR ON EDGE: SEVERAL DAYS
IF YOU CHECKED OFF ANY PROBLEMS ON THIS QUESTIONNAIRE, HOW DIFFICULT HAVE THESE PROBLEMS MADE IT FOR YOU TO DO YOUR WORK, TAKE CARE OF THINGS AT HOME, OR GET ALONG WITH OTHER PEOPLE: NOT DIFFICULT AT ALL
6. BECOMING EASILY ANNOYED OR IRRITABLE: SEVERAL DAYS
5. BEING SO RESTLESS THAT IT IS HARD TO SIT STILL: SEVERAL DAYS

## 2024-03-04 ASSESSMENT — PATIENT HEALTH QUESTIONNAIRE - PHQ9
SUM OF ALL RESPONSES TO PHQ QUESTIONS 1-9: 1
5. POOR APPETITE OR OVEREATING: 0 - NOT AT ALL
CLINICAL INTERPRETATION OF PHQ2 SCORE: 1

## 2024-03-04 NOTE — PROGRESS NOTES
Evaluation completed by: Harrison Conde M.D.   Date of Service: 03/04/2024  Appointment type: in-office appointment.    Information below was collected from: patient      CHIEF COMPLIANT:  Medication Management    HPI:   Dori Martinez is a 18 y.o. old female who presents today for follow up appointment to address Medication Management    Patient is currently taking Effexor XR 75 mg and Wellbutrin  mg every morning.  Her depression and anxiety have been in remission and she denies concerns with these medications, including adverse effects.  She denies concerns about financial or social stressors and has been able to adjust to challenges appropriately..  She is seeing a therapist at our clinic and this has been helpful as well.      CURRENT MEDICATIONS    Current Outpatient Medications:     buPROPion (WELLBUTRIN XL) 300 MG XL tablet, Take 1 Tablet by mouth every morning for 180 days., Disp: 90 Tablet, Rfl: 1    venlafaxine XR (EFFEXOR XR) 75 MG CAPSULE SR 24 HR, Take 1 Capsule by mouth every day for 180 days., Disp: 90 Capsule, Rfl: 1    etonogestrel (NEXPLANON) 68 MG Implant implant, Inject  under the skin., Disp: , Rfl:         3/4/2024    11:00 AM 1/27/2023    11:00 AM 1/5/2023    10:00 AM 1/21/2022     1:00 PM   PHQ-9 Screening   Little interest or pleasure in doing things 0 - not at all 1 - several days 1 - several days 2 - more than half the days   Feeling down, depressed, or hopeless 1 - several days 1 - several days 1 - several days 2 - more than half the days   Trouble falling or staying asleep, or sleeping too much 0 - not at all 1 - several days 0 - not at all 0 - not at all   Feeling tired or having little energy 0 - not at all 1 - several days 2 - more than half the days 3 - nearly every day   Poor appetite or overeating 0 - not at all 1 - several days 0 - not at all 2 - more than half the days   Feeling bad about yourself - or that you are a failure or have let yourself or your family  down 0 - not at all 2 - more than half the days 2 - more than half the days 3 - nearly every day   Trouble concentrating on things, such as reading the newspaper or watching television 0 - not at all 2 - more than half the days 0 - not at all 2 - more than half the days   Moving or speaking so slowly that other people could have noticed. Or the opposite - being so fidgety or restless that you have been moving around a lot more than usual 0 - not at all 0 - not at all 0 - not at all 0 - not at all   Thoughts that you would be better off dead, or of hurting yourself in some way 0 - not at all 0 - not at all 0 - not at all 3 - nearly every day   PHQ-2 Total Score 1 2 2 4   PHQ-9 Total Score 1 9 6 17         3/4/2024    11:16 AM 1/27/2023    11:23 AM 1/5/2023    10:27 AM 1/21/2022     1:39 PM    BOYD-7 ANXIETY SCALE FLOWSHEET   Feeling nervous, anxious, or on edge 1 1 2 3   Not being able to stop or control worrying 0 0 0 3   Worrying too much about different things 0 0 0 3   Trouble relaxing 1 1 0 3   Being so restless that it is hard to sit still 1 2 0 2   Becoming easily annoyed or irritable 1 0 1 2   Feeling afraid as if something awful might happen 0 0 0 1   BOYD-7 Total Score 4 4 3 17   How difficult have these problems made it for you to do your work, take care of things at home, or get along with other people? Not difficult at all Not difficult at all Not difficult at all Very difficult          MEDICAL HISTORY  Past Medical History:   Diagnosis Date    Anxiety     Depression     Ovarian cyst     Panic disorder     Self-injurious behavior     Suicide attempt (HCC)      Allergies:   Allergies   Allergen Reactions    Influenza Vaccine Live Hives     Hives to face and arms; fine with mist subsequently        SURGICAL HISTORY  No past surgical history on file.     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  Taking online college courses - reduced number of credits to maintain work/life/school balance.  Recent breakup after 2 year relationship but feels good about decision.  Currently in a new relationship with no concerns.  Reviewed with patient and no other changes.       PSYCHIATRIC EXAMINATION   Mental Status Exam    Appearance: Appropriate dress and grooming  Sensorium: Alert and Oriented X 4  Behavior: Appropriate  Motor Activity: Unremarkable  Eye Contact: Adequate  Speech: Normal  Mood: Euthymic  Affect: Congruent with normal range  Thought Flow: Linear  Thought Content: Unremarkable  Suicidality: Denies  Hallucinations: Denies  Cognition: Normal  Insight: Intact  Reliability: Apparently reliable  Judgement: Good          CURRENT RISK ASSESSMENT       Suicide: Low       Homicide: Low       Self-Harm: Low       Relapse: Not applicable       Crisis Safety Plan Reviewed Not Indicated    [place]  records   reviewed.  No concerns about misuse of controlled substance.    ASSESSMENT  Dori Martinez is a 18 y.o. old female who presents today for follow up appointment to address Medication Management      DIAGNOSES/PLAN  1. Generalized anxiety disorder  - buPROPion (WELLBUTRIN XL) 300 MG XL tablet; Take 1 Tablet by mouth every morning for 180 days.  Dispense: 90 Tablet; Refill: 1  - venlafaxine XR (EFFEXOR XR) 75 MG CAPSULE SR 24 HR; Take 1 Capsule by mouth every day for 180 days.  Dispense: 90 Capsule; Refill: 1    2. Recurrent major depressive disorder, in full remission (HCC)  - buPROPion (WELLBUTRIN XL) 300 MG XL tablet; Take 1 Tablet by mouth every morning for 180 days.  Dispense: 90 Tablet; Refill: 1  - venlafaxine XR (EFFEXOR  XR) 75 MG CAPSULE SR 24 HR; Take 1 Capsule by mouth every day for 180 days.  Dispense: 90 Capsule; Refill: 1         Medication options, alternatives (including no medications) and medication risks/benefits/side effects were discussed in detail.  The patient was advised to call, message clinician on [employer], or come in to the clinic if symptoms worsen or if questions/issues regarding their medications arise.  The patient verbalized understanding and agreement.    The patient was educated to call 911, call the suicide hotline, or go to the local ER if having thoughts of suicide or homicide.  The patient verbalized understanding and agreement.   The proposed treatment plan was discussed with the patient who was provided the opportunity to ask questions and make suggestions regarding alternative treatment. Patient verbalized understanding and expressed agreement with the plan.      Return to clinic in 12 weeks or sooner if symptoms worsen.    This appointment was discussed with attending psychiatrist, who agrees with assessment and treatment plan.  See attending attestation for more details.     Harrison Conde M.D.

## 2024-03-15 SDOH — ECONOMIC STABILITY: HOUSING INSECURITY: IN THE LAST 12 MONTHS, HOW MANY PLACES HAVE YOU LIVED?: 1

## 2024-03-15 SDOH — ECONOMIC STABILITY: INCOME INSECURITY: HOW HARD IS IT FOR YOU TO PAY FOR THE VERY BASICS LIKE FOOD, HOUSING, MEDICAL CARE, AND HEATING?: NOT VERY HARD

## 2024-03-15 SDOH — ECONOMIC STABILITY: FOOD INSECURITY: WITHIN THE PAST 12 MONTHS, YOU WORRIED THAT YOUR FOOD WOULD RUN OUT BEFORE YOU GOT MONEY TO BUY MORE.: NEVER TRUE

## 2024-03-15 SDOH — ECONOMIC STABILITY: INCOME INSECURITY: IN THE LAST 12 MONTHS, WAS THERE A TIME WHEN YOU WERE NOT ABLE TO PAY THE MORTGAGE OR RENT ON TIME?: NO

## 2024-03-15 SDOH — ECONOMIC STABILITY: FOOD INSECURITY: WITHIN THE PAST 12 MONTHS, THE FOOD YOU BOUGHT JUST DIDN'T LAST AND YOU DIDN'T HAVE MONEY TO GET MORE.: NEVER TRUE

## 2024-03-15 SDOH — HEALTH STABILITY: PHYSICAL HEALTH: ON AVERAGE, HOW MANY DAYS PER WEEK DO YOU ENGAGE IN MODERATE TO STRENUOUS EXERCISE (LIKE A BRISK WALK)?: 3 DAYS

## 2024-03-15 SDOH — HEALTH STABILITY: PHYSICAL HEALTH: ON AVERAGE, HOW MANY MINUTES DO YOU ENGAGE IN EXERCISE AT THIS LEVEL?: 60 MIN

## 2024-03-15 SDOH — HEALTH STABILITY: MENTAL HEALTH
STRESS IS WHEN SOMEONE FEELS TENSE, NERVOUS, ANXIOUS, OR CAN'T SLEEP AT NIGHT BECAUSE THEIR MIND IS TROUBLED. HOW STRESSED ARE YOU?: TO SOME EXTENT

## 2024-03-15 ASSESSMENT — SOCIAL DETERMINANTS OF HEALTH (SDOH)
HOW OFTEN DO YOU HAVE SIX OR MORE DRINKS ON ONE OCCASION: NEVER
HOW OFTEN DO YOU ATTEND CHURCH OR RELIGIOUS SERVICES?: NEVER
DO YOU BELONG TO ANY CLUBS OR ORGANIZATIONS SUCH AS CHURCH GROUPS UNIONS, FRATERNAL OR ATHLETIC GROUPS, OR SCHOOL GROUPS?: NO
ARE YOU MARRIED, WIDOWED, DIVORCED, SEPARATED, NEVER MARRIED, OR LIVING WITH A PARTNER?: NEVER MARRIED
WITHIN THE PAST 12 MONTHS, YOU WORRIED THAT YOUR FOOD WOULD RUN OUT BEFORE YOU GOT THE MONEY TO BUY MORE: NEVER TRUE
IN A TYPICAL WEEK, HOW MANY TIMES DO YOU TALK ON THE PHONE WITH FAMILY, FRIENDS, OR NEIGHBORS?: MORE THAN THREE TIMES A WEEK
IN A TYPICAL WEEK, HOW MANY TIMES DO YOU TALK ON THE PHONE WITH FAMILY, FRIENDS, OR NEIGHBORS?: MORE THAN THREE TIMES A WEEK
HOW OFTEN DO YOU GET TOGETHER WITH FRIENDS OR RELATIVES?: MORE THAN THREE TIMES A WEEK
HOW OFTEN DO YOU ATTEND CHURCH OR RELIGIOUS SERVICES?: NEVER
HOW MANY DRINKS CONTAINING ALCOHOL DO YOU HAVE ON A TYPICAL DAY WHEN YOU ARE DRINKING: PATIENT DOES NOT DRINK
DO YOU BELONG TO ANY CLUBS OR ORGANIZATIONS SUCH AS CHURCH GROUPS UNIONS, FRATERNAL OR ATHLETIC GROUPS, OR SCHOOL GROUPS?: NO
ARE YOU MARRIED, WIDOWED, DIVORCED, SEPARATED, NEVER MARRIED, OR LIVING WITH A PARTNER?: NEVER MARRIED
HOW OFTEN DO YOU GET TOGETHER WITH FRIENDS OR RELATIVES?: MORE THAN THREE TIMES A WEEK
ARE YOU MARRIED, WIDOWED, DIVORCED, SEPARATED, NEVER MARRIED, OR LIVING WITH A PARTNER?: NEVER MARRIED
HOW HARD IS IT FOR YOU TO PAY FOR THE VERY BASICS LIKE FOOD, HOUSING, MEDICAL CARE, AND HEATING?: NOT VERY HARD
DO YOU BELONG TO ANY CLUBS OR ORGANIZATIONS SUCH AS CHURCH GROUPS UNIONS, FRATERNAL OR ATHLETIC GROUPS, OR SCHOOL GROUPS?: NO
HOW OFTEN DO YOU GET TOGETHER WITH FRIENDS OR RELATIVES?: MORE THAN THREE TIMES A WEEK
HOW OFTEN DO YOU ATTEND CHURCH OR RELIGIOUS SERVICES?: NEVER
IN A TYPICAL WEEK, HOW MANY TIMES DO YOU TALK ON THE PHONE WITH FAMILY, FRIENDS, OR NEIGHBORS?: MORE THAN THREE TIMES A WEEK
HOW OFTEN DO YOU HAVE A DRINK CONTAINING ALCOHOL: NEVER

## 2024-03-15 ASSESSMENT — LIFESTYLE VARIABLES
HOW OFTEN DO YOU HAVE A DRINK CONTAINING ALCOHOL: NEVER
HOW MANY STANDARD DRINKS CONTAINING ALCOHOL DO YOU HAVE ON A TYPICAL DAY: PATIENT DOES NOT DRINK
HOW OFTEN DO YOU HAVE A DRINK CONTAINING ALCOHOL: NEVER
SKIP TO QUESTIONS 9-10: 1
AUDIT-C TOTAL SCORE: 0
AUDIT-C TOTAL SCORE: 0
HOW OFTEN DO YOU HAVE SIX OR MORE DRINKS ON ONE OCCASION: NEVER
HOW MANY STANDARD DRINKS CONTAINING ALCOHOL DO YOU HAVE ON A TYPICAL DAY: PATIENT DOES NOT DRINK
SKIP TO QUESTIONS 9-10: 1
HOW OFTEN DO YOU HAVE SIX OR MORE DRINKS ON ONE OCCASION: NEVER

## 2024-03-18 ENCOUNTER — APPOINTMENT (OUTPATIENT)
Dept: MEDICAL GROUP | Facility: PHYSICIAN GROUP | Age: 19
End: 2024-03-18
Payer: COMMERCIAL

## 2024-03-18 SDOH — HEALTH STABILITY: PHYSICAL HEALTH: ON AVERAGE, HOW MANY MINUTES DO YOU ENGAGE IN EXERCISE AT THIS LEVEL?: 60 MIN

## 2024-03-18 SDOH — HEALTH STABILITY: PHYSICAL HEALTH: ON AVERAGE, HOW MANY DAYS PER WEEK DO YOU ENGAGE IN MODERATE TO STRENUOUS EXERCISE (LIKE A BRISK WALK)?: 3 DAYS

## 2024-03-18 SDOH — ECONOMIC STABILITY: HOUSING INSECURITY: IN THE LAST 12 MONTHS, HOW MANY PLACES HAVE YOU LIVED?: 1

## 2024-03-18 ASSESSMENT — SOCIAL DETERMINANTS OF HEALTH (SDOH)
WITHIN THE PAST 12 MONTHS, YOU WORRIED THAT YOUR FOOD WOULD RUN OUT BEFORE YOU GOT THE MONEY TO BUY MORE: NEVER TRUE
HOW OFTEN DO YOU HAVE A DRINK CONTAINING ALCOHOL: NEVER
HOW MANY DRINKS CONTAINING ALCOHOL DO YOU HAVE ON A TYPICAL DAY WHEN YOU ARE DRINKING: PATIENT DOES NOT DRINK
DO YOU BELONG TO ANY CLUBS OR ORGANIZATIONS SUCH AS CHURCH GROUPS UNIONS, FRATERNAL OR ATHLETIC GROUPS, OR SCHOOL GROUPS?: NO
HOW HARD IS IT FOR YOU TO PAY FOR THE VERY BASICS LIKE FOOD, HOUSING, MEDICAL CARE, AND HEATING?: NOT VERY HARD
ARE YOU MARRIED, WIDOWED, DIVORCED, SEPARATED, NEVER MARRIED, OR LIVING WITH A PARTNER?: NEVER MARRIED
HOW OFTEN DO YOU HAVE SIX OR MORE DRINKS ON ONE OCCASION: NEVER
HOW OFTEN DO YOU GET TOGETHER WITH FRIENDS OR RELATIVES?: MORE THAN THREE TIMES A WEEK
HOW OFTEN DO YOU ATTEND CHURCH OR RELIGIOUS SERVICES?: NEVER
IN A TYPICAL WEEK, HOW MANY TIMES DO YOU TALK ON THE PHONE WITH FAMILY, FRIENDS, OR NEIGHBORS?: MORE THAN THREE TIMES A WEEK

## 2024-03-21 ENCOUNTER — APPOINTMENT (OUTPATIENT)
Dept: LAB | Facility: MEDICAL CENTER | Age: 19
End: 2024-03-21
Attending: STUDENT IN AN ORGANIZED HEALTH CARE EDUCATION/TRAINING PROGRAM
Payer: COMMERCIAL

## 2024-03-25 ENCOUNTER — APPOINTMENT (OUTPATIENT)
Dept: BEHAVIORAL HEALTH | Facility: CLINIC | Age: 19
End: 2024-03-25
Payer: COMMERCIAL

## 2024-04-02 ENCOUNTER — APPOINTMENT (OUTPATIENT)
Dept: URGENT CARE | Facility: PHYSICIAN GROUP | Age: 19
End: 2024-04-02
Payer: COMMERCIAL

## 2024-04-03 ENCOUNTER — OFFICE VISIT (OUTPATIENT)
Dept: URGENT CARE | Facility: PHYSICIAN GROUP | Age: 19
End: 2024-04-03
Payer: COMMERCIAL

## 2024-04-03 VITALS
WEIGHT: 235 LBS | DIASTOLIC BLOOD PRESSURE: 66 MMHG | BODY MASS INDEX: 35.61 KG/M2 | TEMPERATURE: 97.8 F | RESPIRATION RATE: 16 BRPM | SYSTOLIC BLOOD PRESSURE: 108 MMHG | HEIGHT: 68 IN | HEART RATE: 100 BPM | OXYGEN SATURATION: 98 %

## 2024-04-03 DIAGNOSIS — J02.9 PHARYNGITIS, UNSPECIFIED ETIOLOGY: ICD-10-CM

## 2024-04-03 LAB — S PYO DNA SPEC NAA+PROBE: NOT DETECTED

## 2024-04-03 PROCEDURE — 87651 STREP A DNA AMP PROBE: CPT | Performed by: PHYSICIAN ASSISTANT

## 2024-04-03 PROCEDURE — 3078F DIAST BP <80 MM HG: CPT | Performed by: PHYSICIAN ASSISTANT

## 2024-04-03 PROCEDURE — 99213 OFFICE O/P EST LOW 20 MIN: CPT | Performed by: PHYSICIAN ASSISTANT

## 2024-04-03 PROCEDURE — 3074F SYST BP LT 130 MM HG: CPT | Performed by: PHYSICIAN ASSISTANT

## 2024-04-03 RX ORDER — DEXAMETHASONE SODIUM PHOSPHATE 10 MG/ML
10 INJECTION INTRAMUSCULAR; INTRAVENOUS ONCE
Status: COMPLETED | OUTPATIENT
Start: 2024-04-03 | End: 2024-04-03

## 2024-04-03 RX ADMIN — DEXAMETHASONE SODIUM PHOSPHATE 10 MG: 10 INJECTION INTRAMUSCULAR; INTRAVENOUS at 09:38

## 2024-04-03 ASSESSMENT — ENCOUNTER SYMPTOMS
SORE THROAT: 1
ABDOMINAL PAIN: 0
EYE REDNESS: 0
CHILLS: 0
SINUS PAIN: 0
DIARRHEA: 0
EYE DISCHARGE: 0
COUGH: 0
DIAPHORESIS: 0
VOMITING: 0
CONSTIPATION: 0
HEADACHES: 0
DIZZINESS: 0
SHORTNESS OF BREATH: 0
WHEEZING: 0
FEVER: 0
EYE PAIN: 0
NAUSEA: 0

## 2024-04-03 ASSESSMENT — FIBROSIS 4 INDEX: FIB4 SCORE: 0.17

## 2024-04-03 NOTE — PROGRESS NOTES
"  Subjective:     Dori Martinez  is a 18 y.o. female who presents for Sore Throat (X2-3 days. Got better yesterday but then worsened in the night w/ difficulty swallowing and breathing, feels scratchy. Also has been congested, but notes it's d/t allergies. )       History of asthma she presents today with sore throat has been ongoing the last 2-3 days.  She does have concern of possible strep throat due to symptom severity.  Is having pain with swallowing, denies difficulties with swallowing.  At this time she is experiencing a small amount of sinus congestion and runny nose.  Denies fever/chills/sweats, chest pain, shortness of breath, nausea/vomiting, abdominal pain, diarrhea.  Has been using over-the-counter medications for symptoms.       Review of Systems   Constitutional:  Negative for chills, diaphoresis, fever and malaise/fatigue.   HENT:  Positive for congestion and sore throat. Negative for ear discharge and sinus pain.    Eyes:  Negative for pain, discharge and redness.   Respiratory:  Negative for cough, shortness of breath and wheezing.    Cardiovascular:  Negative for chest pain.   Gastrointestinal:  Negative for abdominal pain, constipation, diarrhea, nausea and vomiting.   Neurological:  Negative for dizziness and headaches.      Allergies   Allergen Reactions    Influenza Vaccine Live Hives     Hives to face and arms; fine with mist subsequently      Past Medical History:   Diagnosis Date    Anxiety     Depression     Ovarian cyst     Panic disorder     Self-injurious behavior     Suicide attempt (HCC)         Objective:   /66 (BP Location: Left arm, Patient Position: Sitting, BP Cuff Size: Adult)   Pulse 100   Temp 36.6 °C (97.8 °F) (Temporal)   Resp 16   Ht 1.727 m (5' 8\") Comment: PT reported  Wt 107 kg (235 lb)   SpO2 98%   BMI 35.73 kg/m²   Physical Exam  Vitals and nursing note reviewed.   Constitutional:       General: She is not in acute distress.     Appearance: " Normal appearance. She is not ill-appearing, toxic-appearing or diaphoretic.   HENT:      Head: Normocephalic.      Right Ear: Tympanic membrane, ear canal and external ear normal. There is no impacted cerumen.      Left Ear: Tympanic membrane, ear canal and external ear normal. There is no impacted cerumen.      Nose: No congestion or rhinorrhea.      Mouth/Throat:      Mouth: Mucous membranes are moist.      Pharynx: Posterior oropharyngeal erythema present. No oropharyngeal exudate.      Comments: No tonsillar swelling, bilaterally.  No soft tissue swelling of the sublingual mucosa, no swelling of the soft or hard palate, no unilarteral oral pharynx swelling, no uvular deviation.  Eyes:      General:         Right eye: No discharge.         Left eye: No discharge.      Conjunctiva/sclera: Conjunctivae normal.   Cardiovascular:      Rate and Rhythm: Normal rate and regular rhythm.   Pulmonary:      Effort: Pulmonary effort is normal. No respiratory distress.      Breath sounds: Normal breath sounds. No stridor. No wheezing or rhonchi.   Musculoskeletal:      Cervical back: Neck supple.   Lymphadenopathy:      Cervical: No cervical adenopathy.   Neurological:      General: No focal deficit present.      Mental Status: She is alert and oriented to person, place, and time.   Psychiatric:         Mood and Affect: Mood normal.         Behavior: Behavior normal.         Thought Content: Thought content normal.         Judgment: Judgment normal.             Diagnostic testing:    Monique Strep -negative, notified via [employer] message    Assessment/Plan:     Encounter Diagnoses   Name Primary?    Pharyngitis, unspecified etiology           Plan for care for today's complaint includes providing the patient 10 mg oral Decadron in office today.  Strep test was negative, no evidence of antibiotic use at this time.  Use warm salt water gargles, alternate tylenol and ibuprofen for pain, consume soft foods and cool liquids, throat  lozenges as directed for additional symptom support.  I considered other causes of pharyngitis including Group C, G strep, peritonsillar abscess, Mononucleosis, malachi's angina, and retropharyngeal abscess but the patient's reported symptoms and my exam do not support these alternative diagnosis based on information I have available today.  Continue to monitor symptoms and return to urgent care or follow-up with primary care provider if symptoms remain ongoing.  Follow-up in the emergency department if symptoms become severe, ER precautions discussed in office today..    See AVS Instructions below for written guidance provided to patient on after-visit management and care in addition to our verbal discussion during the visit.    Please note that this dictation was created using voice recognition software. I have attempted to correct all errors, but there may be sound-alike, spelling, grammar and possibly content errors that I did not discover before finalizing the note.    Hero Zamora PA-C

## 2024-04-22 ENCOUNTER — OFFICE VISIT (OUTPATIENT)
Dept: BEHAVIORAL HEALTH | Facility: CLINIC | Age: 19
End: 2024-04-22
Payer: COMMERCIAL

## 2024-04-22 DIAGNOSIS — F41.1 GENERALIZED ANXIETY DISORDER: ICD-10-CM

## 2024-04-22 DIAGNOSIS — F33.42 RECURRENT MAJOR DEPRESSIVE DISORDER, IN FULL REMISSION (HCC): ICD-10-CM

## 2024-04-22 PROCEDURE — 90834 PSYTX W PT 45 MINUTES: CPT

## 2024-04-22 NOTE — PROGRESS NOTES
"[facility]   Therapy Progress Note      Therapy was provided on this date in coordination with the [facility] approved Clinical Supervisor under the direct supervision of Dr. Cadena who was on site during this visit.    Therapist reviewed informed consent, limits of confidentiality and [facility] policies; patient expressed understanding and agreed to voluntarily proceed with evaluation and treatment.    Name: Dori Martinez  MRN: [id number]  : 2005  Age: 18 y.o.  Date of assessment: 2024  PCP: DEIDRE Groves  Persons in attendance: Patient and SIL Juarez-Intern  Total session time: 50 minutes      Topics addressed in psychotherapy include:     Data: Client was alert and oriented, presented optimistic and nonchalant. Client has noticed improvement in relationship after setting boundaries with boyfriend. Client is trying to determine what she wants her relationship with Dad to look like.     Assessment: Client reported she has been reflecting more on how to navigate relationship with Dad. He contacted client via text for the first time in a couple of months only to reprimand her. Client experienced feeling \"a pit in my stomach\" when seeing he had texted her, attempted to ignore it but eventually had to take a break at work to address him. Client reported feeling like every conversation with him starts as a confrontation or eventually turns into one. Explored client's desired relationship with Dad and what she believes is a realistic expectation. Discussed use of Focus feature on phone to prevent contact during school or work hours. Dicussed setting firm boundaries with clear consequences if they are overstepped. Client responded well, reported intention of further analyzing relationship expectations and identifying what boundaries she will place for herself.     Plan: Supportive psychotherapy was provided. Plan of care is to continue with supportive, " solution-focused therapy.     Objective Observations:   Participation:Active verbal participation, Attentive, Engaged, and Open to feedback   Grooming:Casual   Cognition:Alert and Fully Oriented   Eye Contact:Good   Mood: Nonchalant   Affect:Congruent with content   Thought Process:Logical and Goal-directed   Speech:Rate within normal limits and Volume within normal limits    Current Risk:   Suicide:  Not indicated   Homicide:  Not indicated   Self-Harm:  Not indicated    Relapse:  Not indicated    Safety Plan Reviewed: not applicable        Diagnosis:  1. Generalized anxiety disorder    2. Recurrent major depressive disorder, in full remission ([facility])            Jennifer Mijares LMSW, CSW-Intern

## 2024-04-29 ENCOUNTER — APPOINTMENT (OUTPATIENT)
Dept: MEDICAL GROUP | Facility: PHYSICIAN GROUP | Age: 19
End: 2024-04-29
Payer: COMMERCIAL

## 2024-05-06 ENCOUNTER — OFFICE VISIT (OUTPATIENT)
Dept: URGENT CARE | Facility: PHYSICIAN GROUP | Age: 19
End: 2024-05-06
Payer: COMMERCIAL

## 2024-05-06 ENCOUNTER — APPOINTMENT (OUTPATIENT)
Dept: URGENT CARE | Facility: PHYSICIAN GROUP | Age: 19
End: 2024-05-06
Payer: COMMERCIAL

## 2024-05-06 VITALS
BODY MASS INDEX: 36.15 KG/M2 | HEART RATE: 76 BPM | SYSTOLIC BLOOD PRESSURE: 96 MMHG | OXYGEN SATURATION: 98 % | WEIGHT: 238.54 LBS | DIASTOLIC BLOOD PRESSURE: 58 MMHG | RESPIRATION RATE: 16 BRPM | HEIGHT: 68 IN | TEMPERATURE: 97.7 F

## 2024-05-06 DIAGNOSIS — J06.9 VIRAL URI WITH COUGH: ICD-10-CM

## 2024-05-06 PROCEDURE — 3078F DIAST BP <80 MM HG: CPT | Performed by: FAMILY MEDICINE

## 2024-05-06 PROCEDURE — 3074F SYST BP LT 130 MM HG: CPT | Performed by: FAMILY MEDICINE

## 2024-05-06 PROCEDURE — 99213 OFFICE O/P EST LOW 20 MIN: CPT | Performed by: FAMILY MEDICINE

## 2024-05-06 ASSESSMENT — ENCOUNTER SYMPTOMS
COUGH: 1
SORE THROAT: 1

## 2024-05-06 ASSESSMENT — FIBROSIS 4 INDEX: FIB4 SCORE: 0.17

## 2024-05-06 NOTE — LETTER
May 6, 2024    To Whom It May Concern:         This is confirmation that Dori Martinez attended her scheduled appointment with Maurice Giron M.D. on 5/06/24.  She is recovering from an acute illness.  She is anticipated to be well enough to return to work by 5/9/2024.  She may return sooner if feeling better more quickly than anticipated.         If you have any questions please do not hesitate to call me at the phone number listed below.    Sincerely,          Maurice Giron M.D.  [phone]

## 2024-05-06 NOTE — PROGRESS NOTES
Subjective:     Dori Martinez is a 18 y.o. female who presents for Cough (Body chills, body aches and fatigue X 2-3 days), Pharyngitis (Hurts to swallow x 2-3 days.), and Headache (X 2-3 days)    HPI  Pt presents for evaluation of an acute problem  Pt with cough and sore throat for the past 3 days   Cough is productive with clear sputum   Cough Is worse in the morning  Feeling fatigued   Has headaches   Has hx of asthma and using albuterol 2 times per day, with some benefit   No chest pain   Mom ill with similar symptoms     Review of Systems   Constitutional:  Positive for malaise/fatigue.   HENT:  Positive for congestion and sore throat.    Respiratory:  Positive for cough.      PMH:  has a past medical history of Anxiety, Depression, Ovarian cyst, Panic disorder, Self-injurious behavior, and Suicide attempt ([facility]).    She has no past medical history of Alcohol abuse, Bipolar disorder ([facility]), Psychosis ([facility]), PTSD (post-traumatic stress disorder), Seizure ([facility]), or Substance abuse ([facility]).  MEDS:   Current Outpatient Medications:     buPROPion (WELLBUTRIN XL) 300 MG XL tablet, Take 1 Tablet by mouth every morning for 180 days., Disp: 90 Tablet, Rfl: 1    venlafaxine XR (EFFEXOR XR) 75 MG CAPSULE SR 24 HR, Take 1 Capsule by mouth every day for 180 days., Disp: 90 Capsule, Rfl: 1    etonogestrel (NEXPLANON) 68 MG Implant implant, Inject  under the skin., Disp: , Rfl:   ALLERGIES:   Allergies   Allergen Reactions    Influenza Vaccine Live Hives     Hives to face and arms; fine with mist subsequently      SURGHX: History reviewed. No pertinent surgical history.  SOCHX:  reports that she has never smoked. She has never used smokeless tobacco. She reports that she does not currently use alcohol. She reports that she does not currently use drugs after having used the following drugs: Marijuana and Inhaled.     Objective:   BP 96/58 (BP Location: Right arm, Patient Position: Sitting, BP Cuff Size: Adult long)    "Pulse 76   Temp 36.5 °C (97.7 °F) (Temporal)   Resp 16   Ht 1.727 m (5' 8\")   Wt 108 kg (238 lb 8.6 oz)   SpO2 98%   BMI 36.27 kg/m²     Physical Exam  Constitutional:       General: She is not in acute distress.     Appearance: She is well-developed. She is not diaphoretic.   HENT:      Head: Normocephalic and atraumatic.      Right Ear: Tympanic membrane, ear canal and external ear normal.      Left Ear: Tympanic membrane, ear canal and external ear normal.      Nose: Congestion present.      Mouth/Throat:      Mouth: Mucous membranes are moist.      Pharynx: Oropharynx is clear. No oropharyngeal exudate or posterior oropharyngeal erythema.   Neck:      Trachea: No tracheal deviation.   Cardiovascular:      Rate and Rhythm: Normal rate and regular rhythm.   Pulmonary:      Effort: Pulmonary effort is normal. No respiratory distress.      Breath sounds: Normal breath sounds. No wheezing or rales.   Musculoskeletal:      Cervical back: Normal range of motion and neck supple. No tenderness.   Lymphadenopathy:      Cervical: No cervical adenopathy.   Skin:     General: Skin is warm and dry.      Findings: No rash.   Neurological:      Mental Status: She is alert.       Assessment/Plan:   Assessment    1. Viral URI with cough    Patient with viral URI.  No sign of secondary bacterial infection on exam.  Outside the treatment window for influenza.  Given a note for work and reviewed supportive care measures.  Follow-up in urgent care as needed.    "

## 2024-05-20 ENCOUNTER — APPOINTMENT (OUTPATIENT)
Dept: BEHAVIORAL HEALTH | Facility: CLINIC | Age: 19
End: 2024-05-20
Payer: COMMERCIAL

## 2024-06-03 ENCOUNTER — APPOINTMENT (OUTPATIENT)
Dept: BEHAVIORAL HEALTH | Facility: CLINIC | Age: 19
End: 2024-06-03
Payer: COMMERCIAL

## 2024-06-03 DIAGNOSIS — F41.1 GENERALIZED ANXIETY DISORDER: ICD-10-CM

## 2024-06-03 DIAGNOSIS — F33.42 RECURRENT MAJOR DEPRESSIVE DISORDER, IN FULL REMISSION (HCC): ICD-10-CM

## 2024-06-03 PROCEDURE — 99213 OFFICE O/P EST LOW 20 MIN: CPT | Performed by: PSYCHIATRY & NEUROLOGY

## 2024-06-03 RX ORDER — VENLAFAXINE HYDROCHLORIDE 75 MG/1
75 CAPSULE, EXTENDED RELEASE ORAL DAILY
COMMUNITY

## 2024-06-03 RX ORDER — BUPROPION HYDROCHLORIDE 300 MG/1
300 TABLET ORAL EVERY MORNING
Qty: 90 TABLET | Refills: 1 | Status: SHIPPED | OUTPATIENT
Start: 2024-06-03 | End: 2024-11-30

## 2024-06-03 RX ORDER — VENLAFAXINE HYDROCHLORIDE 75 MG/1
75 CAPSULE, EXTENDED RELEASE ORAL DAILY
Qty: 90 CAPSULE | Refills: 1 | Status: SHIPPED | OUTPATIENT
Start: 2024-06-03 | End: 2024-11-30

## 2024-06-03 NOTE — PROGRESS NOTES
Evaluation completed by: Harrison Conde M.D.   Date of Service: 06/03/2024  Appointment type: in-office appointment.    Information below was collected from: patient      CHIEF COMPLIANT:  Medication Management    HPI:   Dori Martinez is a 18 y.o. old female who presents today for follow up appointment to address Medication Management    Patient is currently taking Effexor XR 75 mg and Wellbutrin  mg every morning.  Her depression and anxiety have been in remission and she denies concerns with these medications, including adverse effects.  She denies concerns about financial or social stressors and has been able to adjust to challenges appropriately.  She has a break from classes for the next 5 weeks and did well on most recent tests/classes.  She is seeing a therapist at our clinic and this has been helpful as well.      CURRENT MEDICATIONS    Current Outpatient Medications:     venlafaxine XR (EFFEXOR XR) 75 MG CAPSULE SR 24 HR, Take 1 Capsule by mouth every day for 180 days., Disp: 90 Capsule, Rfl: 1    buPROPion (WELLBUTRIN XL) 300 MG XL tablet, Take 1 Tablet by mouth every morning for 180 days., Disp: 90 Tablet, Rfl: 1    etonogestrel (NEXPLANON) 68 MG Implant implant, Inject  under the skin., Disp: , Rfl:         3/4/2024    11:00 AM 1/27/2023    11:00 AM 1/5/2023    10:00 AM 1/21/2022     1:00 PM   PHQ-9 Screening   Little interest or pleasure in doing things 0 - not at all 1 - several days 1 - several days 2 - more than half the days   Feeling down, depressed, or hopeless 1 - several days 1 - several days 1 - several days 2 - more than half the days   Trouble falling or staying asleep, or sleeping too much 0 - not at all 1 - several days 0 - not at all 0 - not at all   Feeling tired or having little energy 0 - not at all 1 - several days 2 - more than half the days 3 - nearly every day   Poor appetite or overeating 0 - not at all 1 - several days 0 - not at all 2 - more than half the days    Feeling bad about yourself - or that you are a failure or have let yourself or your family down 0 - not at all 2 - more than half the days 2 - more than half the days 3 - nearly every day   Trouble concentrating on things, such as reading the newspaper or watching television 0 - not at all 2 - more than half the days 0 - not at all 2 - more than half the days   Moving or speaking so slowly that other people could have noticed. Or the opposite - being so fidgety or restless that you have been moving around a lot more than usual 0 - not at all 0 - not at all 0 - not at all 0 - not at all   Thoughts that you would be better off dead, or of hurting yourself in some way 0 - not at all 0 - not at all 0 - not at all 3 - nearly every day   PHQ-2 Total Score 1 2 2 4   PHQ-9 Total Score 1 9 6 17         3/4/2024    11:16 AM 1/27/2023    11:23 AM 1/5/2023    10:27 AM 1/21/2022     1:39 PM    BOYD-7 ANXIETY SCALE FLOWSHEET   Feeling nervous, anxious, or on edge 1 1 2 3   Not being able to stop or control worrying 0 0 0 3   Worrying too much about different things 0 0 0 3   Trouble relaxing 1 1 0 3   Being so restless that it is hard to sit still 1 2 0 2   Becoming easily annoyed or irritable 1 0 1 2   Feeling afraid as if something awful might happen 0 0 0 1   BOYD-7 Total Score 4 4 3 17   How difficult have these problems made it for you to do your work, take care of things at home, or get along with other people? Not difficult at all Not difficult at all Not difficult at all Very difficult          MEDICAL HISTORY  Past Medical History:   Diagnosis Date    Anxiety     Depression     Ovarian cyst     Panic disorder     Self-injurious behavior     Suicide attempt (HCC)      Allergies:   Allergies   Allergen Reactions    Influenza Vaccine Live Hives     Hives to face and arms; fine with mist subsequently        SURGICAL HISTORY  No past surgical history on file.     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  Taking online college courses - reduced number of credits to maintain work/life/school balance.  Recent breakup after 2 year relationship but feels good about decision.  Currently in a new relationship with no concerns.  Reviewed with patient and no other changes.       PSYCHIATRIC EXAMINATION   Mental Status Exam    Appearance: Appropriate dress and grooming  Sensorium: Alert and Oriented X 4  Behavior: Appropriate  Motor Activity: Unremarkable  Eye Contact: Adequate  Speech: Normal  Mood: Euthymic  Affect: Congruent with normal range  Thought Flow: Linear  Thought Content: Unremarkable  Suicidality: Denies  Hallucinations: Denies  Cognition: Normal  Insight: Intact  Reliability: Apparently reliable  Judgement: Good          CURRENT RISK ASSESSMENT       Suicide: Low       Homicide: Low       Self-Harm: Low       Relapse: Not applicable       Crisis Safety Plan Reviewed Not Indicated    [place]  records   reviewed.  No concerns about misuse of controlled substance.    ASSESSMENT  Dori Martinez is a 18 y.o. old female who presents today for follow up appointment to address Medication Management  Patient has been stable on current medications and is in therapy.  She denies symptoms of depression or anxiety.  Discussed possible trial of tapering meds slowly in the next 3-6 months if desired.  Discussed transition to new resident in July.     DIAGNOSES/PLAN  1. Generalized anxiety disorder  - buPROPion (WELLBUTRIN XL) 300 MG XL tablet; Take 1 Tablet by mouth every morning for 180 days.  Dispense: 90 Tablet; Refill: 1  - venlafaxine XR  (EFFEXOR XR) 75 MG CAPSULE SR 24 HR; Take 1 Capsule by mouth every day for 180 days.  Dispense: 90 Capsule; Refill: 1    2. Recurrent major depressive disorder, in full remission (HCC)  - buPROPion (WELLBUTRIN XL) 300 MG XL tablet; Take 1 Tablet by mouth every morning for 180 days.  Dispense: 90 Tablet; Refill: 1  - venlafaxine XR (EFFEXOR XR) 75 MG CAPSULE SR 24 HR; Take 1 Capsule by mouth every day for 180 days.  Dispense: 90 Capsule; Refill: 1     Continue therapy.      Medication options, alternatives (including no medications) and medication risks/benefits/side effects were discussed in detail.  The patient was advised to call, message clinician on [email or web address], or come in to the clinic if symptoms worsen or if questions/issues regarding their medications arise.  The patient verbalized understanding and agreement.    The patient was educated to call 911, call the suicide hotline, or go to the local ER if having thoughts of suicide or homicide.  The patient verbalized understanding and agreement.   The proposed treatment plan was discussed with the patient who was provided the opportunity to ask questions and make suggestions regarding alternative treatment. Patient verbalized understanding and expressed agreement with the plan.      Return to clinic in 12 weeks or sooner if symptoms worsen.    This appointment was discussed with attending psychiatrist, who agrees with assessment and treatment plan.  See attending attestation for more details.     Harrison Conde M.D.

## 2024-06-17 ENCOUNTER — APPOINTMENT (OUTPATIENT)
Dept: BEHAVIORAL HEALTH | Facility: CLINIC | Age: 19
End: 2024-06-17
Payer: COMMERCIAL

## 2024-06-30 SDOH — ECONOMIC STABILITY: INCOME INSECURITY: HOW HARD IS IT FOR YOU TO PAY FOR THE VERY BASICS LIKE FOOD, HOUSING, MEDICAL CARE, AND HEATING?: SOMEWHAT HARD

## 2024-06-30 SDOH — ECONOMIC STABILITY: FOOD INSECURITY: WITHIN THE PAST 12 MONTHS, THE FOOD YOU BOUGHT JUST DIDN'T LAST AND YOU DIDN'T HAVE MONEY TO GET MORE.: NEVER TRUE

## 2024-06-30 SDOH — ECONOMIC STABILITY: HOUSING INSECURITY: IN THE LAST 12 MONTHS, HOW MANY PLACES HAVE YOU LIVED?: 1

## 2024-06-30 SDOH — ECONOMIC STABILITY: INCOME INSECURITY: IN THE LAST 12 MONTHS, WAS THERE A TIME WHEN YOU WERE NOT ABLE TO PAY THE MORTGAGE OR RENT ON TIME?: NO

## 2024-06-30 SDOH — HEALTH STABILITY: PHYSICAL HEALTH: ON AVERAGE, HOW MANY DAYS PER WEEK DO YOU ENGAGE IN MODERATE TO STRENUOUS EXERCISE (LIKE A BRISK WALK)?: 4 DAYS

## 2024-06-30 SDOH — HEALTH STABILITY: PHYSICAL HEALTH: ON AVERAGE, HOW MANY MINUTES DO YOU ENGAGE IN EXERCISE AT THIS LEVEL?: 60 MIN

## 2024-06-30 SDOH — ECONOMIC STABILITY: FOOD INSECURITY: WITHIN THE PAST 12 MONTHS, YOU WORRIED THAT YOUR FOOD WOULD RUN OUT BEFORE YOU GOT MONEY TO BUY MORE.: NEVER TRUE

## 2024-06-30 ASSESSMENT — SOCIAL DETERMINANTS OF HEALTH (SDOH)
HOW OFTEN DO YOU ATTEND CHURCH OR RELIGIOUS SERVICES?: NEVER
HOW OFTEN DO YOU HAVE A DRINK CONTAINING ALCOHOL: MONTHLY OR LESS
DO YOU BELONG TO ANY CLUBS OR ORGANIZATIONS SUCH AS CHURCH GROUPS UNIONS, FRATERNAL OR ATHLETIC GROUPS, OR SCHOOL GROUPS?: NO
HOW OFTEN DO YOU GET TOGETHER WITH FRIENDS OR RELATIVES?: MORE THAN THREE TIMES A WEEK
HOW OFTEN DO YOU HAVE SIX OR MORE DRINKS ON ONE OCCASION: NEVER
HOW OFTEN DO YOU ATTENT MEETINGS OF THE CLUB OR ORGANIZATION YOU BELONG TO?: NEVER
IN THE PAST 12 MONTHS, HAS THE ELECTRIC, GAS, OIL, OR WATER COMPANY THREATENED TO SHUT OFF SERVICE IN YOUR HOME?: NO
IN A TYPICAL WEEK, HOW MANY TIMES DO YOU TALK ON THE PHONE WITH FAMILY, FRIENDS, OR NEIGHBORS?: MORE THAN THREE TIMES A WEEK
IN A TYPICAL WEEK, HOW MANY TIMES DO YOU TALK ON THE PHONE WITH FAMILY, FRIENDS, OR NEIGHBORS?: MORE THAN THREE TIMES A WEEK
HOW OFTEN DO YOU ATTENT MEETINGS OF THE CLUB OR ORGANIZATION YOU BELONG TO?: NEVER
WITHIN THE PAST 12 MONTHS, YOU WORRIED THAT YOUR FOOD WOULD RUN OUT BEFORE YOU GOT THE MONEY TO BUY MORE: NEVER TRUE
ARE YOU MARRIED, WIDOWED, DIVORCED, SEPARATED, NEVER MARRIED, OR LIVING WITH A PARTNER?: NEVER MARRIED
HOW MANY DRINKS CONTAINING ALCOHOL DO YOU HAVE ON A TYPICAL DAY WHEN YOU ARE DRINKING: 1 OR 2
HOW HARD IS IT FOR YOU TO PAY FOR THE VERY BASICS LIKE FOOD, HOUSING, MEDICAL CARE, AND HEATING?: SOMEWHAT HARD
HOW OFTEN DO YOU GET TOGETHER WITH FRIENDS OR RELATIVES?: MORE THAN THREE TIMES A WEEK
HOW OFTEN DO YOU ATTEND CHURCH OR RELIGIOUS SERVICES?: NEVER
DO YOU BELONG TO ANY CLUBS OR ORGANIZATIONS SUCH AS CHURCH GROUPS UNIONS, FRATERNAL OR ATHLETIC GROUPS, OR SCHOOL GROUPS?: NO
ARE YOU MARRIED, WIDOWED, DIVORCED, SEPARATED, NEVER MARRIED, OR LIVING WITH A PARTNER?: NEVER MARRIED

## 2024-06-30 ASSESSMENT — LIFESTYLE VARIABLES
HOW OFTEN DO YOU HAVE A DRINK CONTAINING ALCOHOL: MONTHLY OR LESS
HOW OFTEN DO YOU HAVE SIX OR MORE DRINKS ON ONE OCCASION: NEVER
HOW MANY STANDARD DRINKS CONTAINING ALCOHOL DO YOU HAVE ON A TYPICAL DAY: 1 OR 2
AUDIT-C TOTAL SCORE: 1
SKIP TO QUESTIONS 9-10: 1

## 2024-07-01 ENCOUNTER — APPOINTMENT (OUTPATIENT)
Dept: MEDICAL GROUP | Facility: PHYSICIAN GROUP | Age: 19
End: 2024-07-01
Payer: COMMERCIAL

## 2024-07-01 ENCOUNTER — HOSPITAL ENCOUNTER (OUTPATIENT)
Facility: MEDICAL CENTER | Age: 19
End: 2024-07-01
Payer: COMMERCIAL

## 2024-07-01 VITALS
OXYGEN SATURATION: 96 % | TEMPERATURE: 97.7 F | WEIGHT: 237 LBS | SYSTOLIC BLOOD PRESSURE: 110 MMHG | DIASTOLIC BLOOD PRESSURE: 72 MMHG | RESPIRATION RATE: 16 BRPM | HEIGHT: 68 IN | HEART RATE: 94 BPM | BODY MASS INDEX: 35.92 KG/M2

## 2024-07-01 DIAGNOSIS — Z01.419 ENCOUNTER FOR GYNECOLOGICAL EXAMINATION: ICD-10-CM

## 2024-07-01 DIAGNOSIS — Z00.00 WELLNESS EXAMINATION: ICD-10-CM

## 2024-07-01 DIAGNOSIS — Z11.3 SCREENING FOR STD (SEXUALLY TRANSMITTED DISEASE): ICD-10-CM

## 2024-07-01 DIAGNOSIS — Z12.4 SCREENING FOR CERVICAL CANCER: ICD-10-CM

## 2024-07-01 DIAGNOSIS — Z11.4 SCREENING FOR HIV (HUMAN IMMUNODEFICIENCY VIRUS): ICD-10-CM

## 2024-07-01 DIAGNOSIS — Z11.59 NEED FOR HEPATITIS C SCREENING TEST: ICD-10-CM

## 2024-07-01 DIAGNOSIS — Z11.51 SCREENING FOR HPV (HUMAN PAPILLOMAVIRUS): ICD-10-CM

## 2024-07-01 DIAGNOSIS — Z23 NEED FOR VACCINATION: ICD-10-CM

## 2024-07-01 PROCEDURE — 87491 CHLMYD TRACH DNA AMP PROBE: CPT

## 2024-07-01 PROCEDURE — 87591 N.GONORRHOEAE DNA AMP PROB: CPT

## 2024-07-01 PROCEDURE — 90472 IMMUNIZATION ADMIN EACH ADD: CPT

## 2024-07-01 PROCEDURE — 90619 MENACWY-TT VACCINE IM: CPT

## 2024-07-01 PROCEDURE — 87624 HPV HI-RISK TYP POOLED RSLT: CPT

## 2024-07-01 PROCEDURE — 90471 IMMUNIZATION ADMIN: CPT

## 2024-07-01 PROCEDURE — 99459 PELVIC EXAMINATION: CPT

## 2024-07-01 PROCEDURE — 90715 TDAP VACCINE 7 YRS/> IM: CPT

## 2024-07-01 PROCEDURE — 88175 CYTOPATH C/V AUTO FLUID REDO: CPT

## 2024-07-01 PROCEDURE — 99395 PREV VISIT EST AGE 18-39: CPT | Mod: 25

## 2024-07-01 ASSESSMENT — FIBROSIS 4 INDEX: FIB4 SCORE: 0.18

## 2024-07-02 ENCOUNTER — HOSPITAL ENCOUNTER (OUTPATIENT)
Facility: MEDICAL CENTER | Age: 19
End: 2024-07-02
Payer: COMMERCIAL

## 2024-07-02 DIAGNOSIS — Z11.59 NEED FOR HEPATITIS C SCREENING TEST: ICD-10-CM

## 2024-07-02 DIAGNOSIS — Z00.00 WELLNESS EXAMINATION: ICD-10-CM

## 2024-07-02 DIAGNOSIS — Z12.4 SCREENING FOR CERVICAL CANCER: ICD-10-CM

## 2024-07-02 DIAGNOSIS — Z01.419 ENCOUNTER FOR GYNECOLOGICAL EXAMINATION: ICD-10-CM

## 2024-07-02 DIAGNOSIS — Z11.4 SCREENING FOR HIV (HUMAN IMMUNODEFICIENCY VIRUS): ICD-10-CM

## 2024-07-02 DIAGNOSIS — Z11.51 SCREENING FOR HPV (HUMAN PAPILLOMAVIRUS): ICD-10-CM

## 2024-07-02 LAB
ALBUMIN SERPL BCP-MCNC: 4.2 G/DL (ref 3.2–4.9)
ALBUMIN/GLOB SERPL: 1.4 G/DL
ALP SERPL-CCNC: 63 U/L (ref 30–99)
ALT SERPL-CCNC: 27 U/L (ref 2–50)
ANION GAP SERPL CALC-SCNC: 11 MMOL/L (ref 7–16)
AST SERPL-CCNC: 14 U/L (ref 12–45)
BILIRUB SERPL-MCNC: 0.2 MG/DL (ref 0.1–1.5)
BUN SERPL-MCNC: 12 MG/DL (ref 8–22)
C TRACH DNA GENITAL QL NAA+PROBE: NEGATIVE
CALCIUM ALBUM COR SERPL-MCNC: 8.9 MG/DL (ref 8.5–10.5)
CALCIUM SERPL-MCNC: 9.1 MG/DL (ref 8.5–10.5)
CHLORIDE SERPL-SCNC: 107 MMOL/L (ref 96–112)
CHOLEST SERPL-MCNC: 207 MG/DL (ref 100–199)
CO2 SERPL-SCNC: 22 MMOL/L (ref 20–33)
CREAT SERPL-MCNC: 0.92 MG/DL (ref 0.5–1.4)
ERYTHROCYTE [DISTWIDTH] IN BLOOD BY AUTOMATED COUNT: 42 FL (ref 35.9–50)
GFR SERPLBLD CREATININE-BSD FMLA CKD-EPI: 91 ML/MIN/1.73 M 2
GLOBULIN SER CALC-MCNC: 3.1 G/DL (ref 1.9–3.5)
GLUCOSE SERPL-MCNC: 84 MG/DL (ref 65–99)
HCT VFR BLD AUTO: 45.4 % (ref 37–47)
HCV AB SER QL: NORMAL
HDLC SERPL-MCNC: 38 MG/DL
HGB BLD-MCNC: 15 G/DL (ref 12–16)
HIV 1+2 AB+HIV1 P24 AG SERPL QL IA: NORMAL
LDLC SERPL CALC-MCNC: 145 MG/DL
MCH RBC QN AUTO: 29.2 PG (ref 27–33)
MCHC RBC AUTO-ENTMCNC: 33 G/DL (ref 32.2–35.5)
MCV RBC AUTO: 88.3 FL (ref 81.4–97.8)
N GONORRHOEA DNA GENITAL QL NAA+PROBE: NEGATIVE
PLATELET # BLD AUTO: 265 K/UL (ref 164–446)
PMV BLD AUTO: 9.9 FL (ref 9–12.9)
POTASSIUM SERPL-SCNC: 4.5 MMOL/L (ref 3.6–5.5)
PROT SERPL-MCNC: 7.3 G/DL (ref 6–8.2)
RBC # BLD AUTO: 5.14 M/UL (ref 4.2–5.4)
SODIUM SERPL-SCNC: 140 MMOL/L (ref 135–145)
SPECIMEN SOURCE: NORMAL
TRIGL SERPL-MCNC: 118 MG/DL (ref 0–149)
TSH SERPL DL<=0.005 MIU/L-ACNC: 2.6 UIU/ML (ref 0.38–5.33)
WBC # BLD AUTO: 7.3 K/UL (ref 4.8–10.8)

## 2024-07-02 PROCEDURE — 85027 COMPLETE CBC AUTOMATED: CPT

## 2024-07-02 PROCEDURE — 80053 COMPREHEN METABOLIC PANEL: CPT

## 2024-07-02 PROCEDURE — 86803 HEPATITIS C AB TEST: CPT

## 2024-07-02 PROCEDURE — 80061 LIPID PANEL: CPT

## 2024-07-02 PROCEDURE — 84443 ASSAY THYROID STIM HORMONE: CPT

## 2024-07-02 PROCEDURE — 87389 HIV-1 AG W/HIV-1&-2 AB AG IA: CPT

## 2024-07-05 LAB
C TRACH RRNA CVX QL NAA+PROBE: NEGATIVE
CYTOLOGIST CVX/VAG CYTO: NORMAL
CYTOLOGY CVX/VAG DOC CYTO: NORMAL
CYTOLOGY CVX/VAG DOC THIN PREP: NORMAL
HPV I/H RISK 4 DNA CVX QL PROBE+SIG AMP: NEGATIVE
N GONORRHOEA RRNA CVX QL NAA+PROBE: NEGATIVE
NOTE NL11727A: NORMAL
OTHER STN SPEC: NORMAL
STAT OF ADQ CVX/VAG CYTO-IMP: NORMAL

## 2024-07-22 ENCOUNTER — OFFICE VISIT (OUTPATIENT)
Dept: BEHAVIORAL HEALTH | Facility: CLINIC | Age: 19
End: 2024-07-22
Payer: COMMERCIAL

## 2024-07-22 DIAGNOSIS — F33.42 RECURRENT MAJOR DEPRESSIVE DISORDER, IN FULL REMISSION (HCC): ICD-10-CM

## 2024-07-22 DIAGNOSIS — F41.1 GENERALIZED ANXIETY DISORDER: ICD-10-CM

## 2024-07-22 PROCEDURE — 90834 PSYTX W PT 45 MINUTES: CPT

## 2024-07-30 ENCOUNTER — APPOINTMENT (OUTPATIENT)
Dept: URGENT CARE | Facility: PHYSICIAN GROUP | Age: 19
End: 2024-07-30
Payer: COMMERCIAL

## 2024-07-30 ENCOUNTER — PHARMACY VISIT (OUTPATIENT)
Dept: PHARMACY | Facility: MEDICAL CENTER | Age: 19
End: 2024-07-30
Payer: COMMERCIAL

## 2024-07-30 ENCOUNTER — OFFICE VISIT (OUTPATIENT)
Dept: URGENT CARE | Facility: PHYSICIAN GROUP | Age: 19
End: 2024-07-30
Payer: COMMERCIAL

## 2024-07-30 VITALS
BODY MASS INDEX: 36.1 KG/M2 | TEMPERATURE: 97.1 F | DIASTOLIC BLOOD PRESSURE: 64 MMHG | RESPIRATION RATE: 16 BRPM | WEIGHT: 238.21 LBS | HEIGHT: 68 IN | OXYGEN SATURATION: 98 % | SYSTOLIC BLOOD PRESSURE: 102 MMHG | HEART RATE: 99 BPM

## 2024-07-30 DIAGNOSIS — H10.30 ACUTE BACTERIAL CONJUNCTIVITIS, UNSPECIFIED LATERALITY: ICD-10-CM

## 2024-07-30 DIAGNOSIS — H00.015 HORDEOLUM EXTERNUM OF LEFT LOWER EYELID: ICD-10-CM

## 2024-07-30 PROCEDURE — RXMED WILLOW AMBULATORY MEDICATION CHARGE: Performed by: PHYSICIAN ASSISTANT

## 2024-07-30 RX ORDER — MOXIFLOXACIN 5 MG/ML
1 SOLUTION/ DROPS OPHTHALMIC 3 TIMES DAILY
Qty: 3 ML | Refills: 0 | Status: SHIPPED | OUTPATIENT
Start: 2024-07-30 | End: 2024-08-19

## 2024-07-30 ASSESSMENT — ENCOUNTER SYMPTOMS
EYE PAIN: 0
WHEEZING: 0
FEVER: 0
HEADACHES: 0
DIAPHORESIS: 0
EYE DISCHARGE: 1
CHILLS: 0
SINUS PAIN: 0
SORE THROAT: 0
SHORTNESS OF BREATH: 0
DIZZINESS: 0
COUGH: 0
EYE REDNESS: 1

## 2024-07-30 ASSESSMENT — FIBROSIS 4 INDEX: FIB4 SCORE: 0.21

## 2024-08-05 ENCOUNTER — APPOINTMENT (OUTPATIENT)
Dept: BEHAVIORAL HEALTH | Facility: CLINIC | Age: 19
End: 2024-08-05
Payer: COMMERCIAL

## 2024-09-03 ENCOUNTER — APPOINTMENT (OUTPATIENT)
Dept: MEDICAL GROUP | Facility: PHYSICIAN GROUP | Age: 19
End: 2024-09-03
Payer: COMMERCIAL

## 2024-09-03 VITALS
SYSTOLIC BLOOD PRESSURE: 132 MMHG | WEIGHT: 239.8 LBS | HEIGHT: 68 IN | OXYGEN SATURATION: 99 % | BODY MASS INDEX: 36.34 KG/M2 | HEART RATE: 103 BPM | TEMPERATURE: 98.2 F | DIASTOLIC BLOOD PRESSURE: 70 MMHG | RESPIRATION RATE: 18 BRPM

## 2024-09-03 DIAGNOSIS — E78.2 MODERATE MIXED HYPERLIPIDEMIA NOT REQUIRING STATIN THERAPY: ICD-10-CM

## 2024-09-03 DIAGNOSIS — E66.9 OBESITY (BMI 30-39.9): ICD-10-CM

## 2024-09-03 PROCEDURE — 3075F SYST BP GE 130 - 139MM HG: CPT

## 2024-09-03 PROCEDURE — 3078F DIAST BP <80 MM HG: CPT

## 2024-09-03 PROCEDURE — 99214 OFFICE O/P EST MOD 30 MIN: CPT

## 2024-09-03 ASSESSMENT — FIBROSIS 4 INDEX: FIB4 SCORE: 0.21

## 2024-09-04 NOTE — PROGRESS NOTES
Verbal consent was acquired by the patient to use [employer] ambient listening note generation during this visit     Subjective:     HPI:   History of Present Illness  The patient presents for evaluation of lab results and weight loss.    She is considering the use of weight loss medications and wishes to discuss this further before making a decision.    We reviewed the results of her lipid panel as below   Lab Results   Component Value Date/Time    CHOLSTRLTOT 207 (H) 07/02/2024 07:00 AM    TRIGLYCERIDE 118 07/02/2024 07:00 AM    HDL 38 (A) 07/02/2024 07:00 AM     (H) 07/02/2024 07:00 AM   ]      FAMILY HISTORY  She denies any family history of thyroid cancer, medullary endocrine neoplasia type 1 or 2, or pancreatitis.        Assessment & Plan:     Problem List Items Addressed This Visit       Hyperlipidemia     Other Visit Diagnoses       Obesity (BMI 30-39.9)        BMI 36.0-36.9,adult                  1. Moderate mixed hyperlipidemia not requiring statin therapy  Chronic and ongoing   LDL slightly worsened, HDL low at 39  Dietary and lifestyle changes recommended, increase fiber intake.     2. Obesity (BMI 30-39.9)  3. BMI 36.0-36.9,adult  Chronic, not at goal.    Discussed ozempic, risks, benefits, potential side effects, and dosing regimen. Start semaglutide 0.25mg x 4 weeks, 0.5mg x 4 weeks, then increase to 1mg weekly x 4 weeks. Rx faxed to [facility] pharmacy. Follow up in 4-6 weeks after initial dose         Health Maintenance: Orders placed as applicable to patient     Objective:     Exam:  Objective:  Vitals:    09/03/24 1551   BP: 132/70   Pulse: (!) 103   Resp: 18   Temp: 36.8 °C (98.2 °F)   SpO2: 99%     Physical Exam  Physical Exam    Constitutional:       Appearance: Normal appearance.   Eyes:      Extraocular Movements: Extraocular movements intact.   Pulmonary:      Effort: Pulmonary effort is normal.   Neurological:      General: No focal deficit present.      Mental Status: She  is alert and oriented to person, place, and time.   Psychiatric:         Mood and Affect: Mood normal.         Behavior: Behavior normal.       Return in about 8 weeks (around 10/29/2024).    Please note that this dictation was created using voice recognition software. I have made every reasonable attempt to correct obvious errors, but I expect that there are errors of grammar and possibly content that I did not discover before finalizing the note.

## 2024-09-06 ENCOUNTER — HOSPITAL ENCOUNTER (OUTPATIENT)
Facility: MEDICAL CENTER | Age: 19
End: 2024-09-06
Attending: NURSE PRACTITIONER
Payer: COMMERCIAL

## 2024-09-06 ENCOUNTER — OFFICE VISIT (OUTPATIENT)
Dept: URGENT CARE | Facility: PHYSICIAN GROUP | Age: 19
End: 2024-09-06
Payer: COMMERCIAL

## 2024-09-06 VITALS
WEIGHT: 236.44 LBS | HEART RATE: 103 BPM | RESPIRATION RATE: 15 BRPM | SYSTOLIC BLOOD PRESSURE: 122 MMHG | BODY MASS INDEX: 35.83 KG/M2 | HEIGHT: 68 IN | DIASTOLIC BLOOD PRESSURE: 84 MMHG | TEMPERATURE: 97.4 F | OXYGEN SATURATION: 98 %

## 2024-09-06 DIAGNOSIS — J02.9 SORE THROAT: ICD-10-CM

## 2024-09-06 LAB — S PYO DNA SPEC NAA+PROBE: NOT DETECTED

## 2024-09-06 PROCEDURE — 3074F SYST BP LT 130 MM HG: CPT | Performed by: NURSE PRACTITIONER

## 2024-09-06 PROCEDURE — 87651 STREP A DNA AMP PROBE: CPT | Performed by: NURSE PRACTITIONER

## 2024-09-06 PROCEDURE — 87077 CULTURE AEROBIC IDENTIFY: CPT

## 2024-09-06 PROCEDURE — 99213 OFFICE O/P EST LOW 20 MIN: CPT | Performed by: NURSE PRACTITIONER

## 2024-09-06 PROCEDURE — 87070 CULTURE OTHR SPECIMN AEROBIC: CPT

## 2024-09-06 PROCEDURE — 3079F DIAST BP 80-89 MM HG: CPT | Performed by: NURSE PRACTITIONER

## 2024-09-06 ASSESSMENT — FIBROSIS 4 INDEX: FIB4 SCORE: 0.21

## 2024-09-06 NOTE — LETTER
September 6, 2024       Patient: Dori Martinez   YOB: 2005   Date of Visit: 9/6/2024         To Whom It May Concern:    In my medical opinion, I recommend that Dori Martinez be excused from work today due to illness.     If you have any questions or concerns, please don't hesitate to call [phone]          Sincerely,          ELIZABETH [email or web address].  Electronically Signed

## 2024-09-06 NOTE — PROGRESS NOTES
"Verbal consent was acquired by the patient to use [employer] ambient listening note generation during this visit.    Subjective:     HPI:   History of Present Illness  The patient is a 19-year-old female who presents for evaluation of sore throat.    She has been experiencing a sore throat for several days, accompanied by sinus congestion. The sore throat is her primary concern. She has a history of strep throat and suspects this might be a recurrence. She reports no fever or cough, and her tonsils are still intact.  No fever or chills. No body aches.       Objective:     Exam:  /84   Pulse (!) 103   Temp 36.3 °C (97.4 °F) (Temporal)   Resp 15   Ht 1.727 m (5' 8\")   Wt 107 kg (236 lb 7.1 oz)   SpO2 98%   BMI 35.95 kg/m²  Body mass index is 35.95 kg/m².    Physical Exam  Vitals and nursing note reviewed.   Constitutional:       General: She is not in acute distress.     Appearance: Normal appearance. She is not ill-appearing.   HENT:      Head: Normocephalic and atraumatic.      Nose: Nose normal.      Mouth/Throat:      Lips: Pink.      Mouth: Mucous membranes are moist.      Pharynx: Uvula midline. Pharyngeal swelling and posterior oropharyngeal erythema present. No oropharyngeal exudate, uvula swelling or postnasal drip.      Tonsils: No tonsillar exudate or tonsillar abscesses.   Cardiovascular:      Rate and Rhythm: Normal rate.      Pulses: Normal pulses.   Pulmonary:      Effort: Pulmonary effort is normal.   Musculoskeletal:      Cervical back: Normal range of motion.   Neurological:      Mental Status: She is alert.       Results for orders placed or performed in visit on 09/06/24   POCT GROUP A STREP, PCR   Result Value Ref Range    POC Group A Strep, PCR Not Detected Not Detected, Invalid         Assessment & Plan:     1. Sore throat  POCT GROUP A STREP, PCR          Assessment & Plan  1. Sore throat.  Her symptoms are suggestive of a possible strep infection. A swab test for strep will be " performed today. If the strep test is positive, a 10-day course of antibiotics will be initiated, and she will be advised to replace her toothbrush. If the test is negative, a culture will be sent to rule out bacterial infection.      Discussed supportive care including salt water gargles, benzocaine drops  Discussed return precautions including signs and symptoms of peritonsillar abscess, retropharyngeal abscess  Counseled the patient to follow up with primary care provider (or establish a primary care provider) for ongoing health maintenance                 ELIZABETH [email or web address].      Please note that this dictation was created using voice recognition software. I have made every reasonable attempt to correct obvious errors, but I expect that there are errors of grammar and possibly content that I did not discover before finalizing the note.

## 2024-09-07 ENCOUNTER — APPOINTMENT (OUTPATIENT)
Dept: URGENT CARE | Facility: PHYSICIAN GROUP | Age: 19
End: 2024-09-07
Payer: COMMERCIAL

## 2024-09-08 LAB
BACTERIA SPEC RESP CULT: NORMAL
SIGNIFICANT IND 70042: NORMAL
SITE SITE: NORMAL
SOURCE SOURCE: NORMAL

## 2024-10-11 ENCOUNTER — APPOINTMENT (OUTPATIENT)
Dept: BEHAVIORAL HEALTH | Facility: CLINIC | Age: 19
End: 2024-10-11
Payer: COMMERCIAL

## 2024-10-11 DIAGNOSIS — Z86.59 HISTORY OF ANXIETY DISORDER: ICD-10-CM

## 2024-10-11 DIAGNOSIS — F33.42 RECURRENT MAJOR DEPRESSIVE DISORDER, IN FULL REMISSION (HCC): ICD-10-CM

## 2024-10-11 PROCEDURE — RXMED WILLOW AMBULATORY MEDICATION CHARGE

## 2024-10-11 PROCEDURE — 90792 PSYCH DIAG EVAL W/MED SRVCS: CPT | Performed by: PSYCHIATRY & NEUROLOGY

## 2024-10-11 RX ORDER — BUPROPION HYDROCHLORIDE 300 MG/1
300 TABLET ORAL EVERY MORNING
Qty: 90 TABLET | Refills: 2 | Status: SHIPPED | OUTPATIENT
Start: 2024-10-11 | End: 2025-07-08

## 2024-10-11 RX ORDER — VENLAFAXINE HYDROCHLORIDE 75 MG/1
75 CAPSULE, EXTENDED RELEASE ORAL DAILY
Qty: 90 CAPSULE | Refills: 2 | Status: SHIPPED | OUTPATIENT
Start: 2024-10-11 | End: 2025-07-08

## 2024-10-11 ASSESSMENT — ENCOUNTER SYMPTOMS
HALLUCINATIONS: 0
FALLS: 0
CHILLS: 0
FEVER: 0
WHEEZING: 0
LOSS OF CONSCIOUSNESS: 0
SEIZURES: 0
BLURRED VISION: 0
DOUBLE VISION: 0
BLOOD IN STOOL: 0
POLYDIPSIA: 0

## 2024-10-11 ASSESSMENT — LIFESTYLE VARIABLES: SUBSTANCE_ABUSE: 0

## 2024-10-14 ENCOUNTER — PHARMACY VISIT (OUTPATIENT)
Dept: PHARMACY | Facility: MEDICAL CENTER | Age: 19
End: 2024-10-14
Payer: COMMERCIAL

## 2024-10-22 ENCOUNTER — OFFICE VISIT (OUTPATIENT)
Dept: BEHAVIORAL HEALTH | Facility: CLINIC | Age: 19
End: 2024-10-22
Payer: COMMERCIAL

## 2024-10-22 DIAGNOSIS — F33.42 RECURRENT MAJOR DEPRESSIVE DISORDER, IN FULL REMISSION (HCC): ICD-10-CM

## 2024-10-22 PROCEDURE — 90832 PSYTX W PT 30 MINUTES: CPT

## 2025-02-10 ENCOUNTER — APPOINTMENT (OUTPATIENT)
Dept: MEDICAL GROUP | Facility: PHYSICIAN GROUP | Age: 20
End: 2025-02-10
Payer: COMMERCIAL

## 2025-02-19 ENCOUNTER — OFFICE VISIT (OUTPATIENT)
Dept: MEDICAL GROUP | Facility: PHYSICIAN GROUP | Age: 20
End: 2025-02-19
Payer: COMMERCIAL

## 2025-02-19 VITALS
HEART RATE: 87 BPM | RESPIRATION RATE: 18 BRPM | SYSTOLIC BLOOD PRESSURE: 108 MMHG | WEIGHT: 242 LBS | OXYGEN SATURATION: 98 % | TEMPERATURE: 97.4 F | BODY MASS INDEX: 36.68 KG/M2 | HEIGHT: 68 IN | DIASTOLIC BLOOD PRESSURE: 70 MMHG

## 2025-02-19 DIAGNOSIS — Z23 NEED FOR VACCINATION: ICD-10-CM

## 2025-02-19 DIAGNOSIS — Z11.1 SCREENING-PULMONARY TB: ICD-10-CM

## 2025-02-19 DIAGNOSIS — Z78.9 HEPATITIS B VACCINATION STATUS UNKNOWN: ICD-10-CM

## 2025-02-19 PROCEDURE — 90471 IMMUNIZATION ADMIN: CPT

## 2025-02-19 PROCEDURE — 99213 OFFICE O/P EST LOW 20 MIN: CPT | Mod: 25

## 2025-02-19 PROCEDURE — 90621 MENB-FHBP VACC 2/3 DOSE IM: CPT | Mod: JZ

## 2025-02-19 ASSESSMENT — PATIENT HEALTH QUESTIONNAIRE - PHQ9: CLINICAL INTERPRETATION OF PHQ2 SCORE: 0

## 2025-02-19 ASSESSMENT — FIBROSIS 4 INDEX: FIB4 SCORE: 0.21

## 2025-02-20 ENCOUNTER — HOSPITAL ENCOUNTER (OUTPATIENT)
Dept: LAB | Facility: MEDICAL CENTER | Age: 20
End: 2025-02-20
Attending: PHYSICIAN ASSISTANT
Payer: COMMERCIAL

## 2025-02-20 DIAGNOSIS — Z11.1 SCREENING-PULMONARY TB: ICD-10-CM

## 2025-02-20 DIAGNOSIS — Z78.9 HEPATITIS B VACCINATION STATUS UNKNOWN: ICD-10-CM

## 2025-02-20 LAB — HBV SURFACE AB SERPL IA-ACNC: 19.3 MIU/ML (ref 0–10)

## 2025-02-20 PROCEDURE — 36415 COLL VENOUS BLD VENIPUNCTURE: CPT

## 2025-02-20 PROCEDURE — 86480 TB TEST CELL IMMUN MEASURE: CPT

## 2025-02-20 PROCEDURE — 86706 HEP B SURFACE ANTIBODY: CPT

## 2025-02-20 NOTE — PROGRESS NOTES
"Verbal consent was acquired by the patient to use [employer] ambient listening note generation during this visit     Subjective:     HPI:   History of Present Illness  The patient is a 19-year-old female who presents for the completion of paperwork. 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 The patient has provided her vaccination records for review, which indicate that she received the influenza vaccine and one dose of the meningococcal B vaccine in July 2024.     IMMUNIZATIONS  Received varicella, MMR, Tdap within the past 10 years, influenza vaccine, and 1 dose of meningitis B.    Health Maintenance: Completed    ROS:  Gen: no fevers/chills  Eyes: no changes in vision  ENT: no sore throat  Pulm: no sob, no cough  CV: no chest pain  GI: no nausea/vomiting  : no dysuria  MSk: no myalgias  Skin: no rash  Neuro: no headaches  Objective:     Exam:  /70   Pulse 87   Temp 36.3 °C (97.4 °F) (Temporal)   Resp 18   Ht 1.727 m (5' 8\")   Wt 110 kg (242 lb)   SpO2 98%   BMI 36.80 kg/m²  Body mass index is 36.8 kg/m².    PHYSICAL EXAM  Constitutional: Alert, no distress, well-groomed.  Skin: Warm, dry, good turgor, no rashes in visible areas.  Eye: Equal, round and reactive, conjunctiva clear, lids normal.  ENMT: Lips without lesions, good dentition, moist mucous membranes.  Neck: Trachea midline, no masses, no thyromegaly.  Respiratory: Unlabored respiratory effort, no cough.  MSK: Normal gait, moves all extremities.  Neuro: Grossly non-focal.   Psych: Alert and oriented x3, normal affect and mood.    Results      Assessment & Plan:     1. Hepatitis B vaccination status unknown  HEP B SURFACE AB      2. Screening-pulmonary TB  Quantiferon Gold TB (PPD)      3. Need for vaccination  Meningococcal (IM) Group B          Assessment & Plan  1. Paperwork completion: Immunization record up-to-date.  - Order hepatitis B titer to confirm immunity  - Conduct Quantiferon TB test.  - " Administer second dose of meningitis B today.  - Complete paperwork upon lab results.    Follow-up  - Hold paperwork until titer results are available.  - Sign and leave paperwork at the  after titer results.        I spent a total of 23 minutes with record review, exam, communication with the patient, communication with other providers, and documentation of this encounter.    Please note that this dictation was created using voice recognition software. I have made every reasonable attempt to correct obvious errors, but I expect that there are errors of grammar and possibly content that I did not discover before finalizing the note.

## 2025-02-21 LAB
GAMMA INTERFERON BACKGROUND BLD IA-ACNC: 0.02 IU/ML
M TB IFN-G BLD-IMP: NEGATIVE
M TB IFN-G CD4+ BCKGRND COR BLD-ACNC: 0 IU/ML
MITOGEN IGNF BCKGRD COR BLD-ACNC: 7.04 IU/ML
QFT TB2 - NIL TBQ2: 0 IU/ML

## 2025-02-24 ENCOUNTER — RESULTS FOLLOW-UP (OUTPATIENT)
Dept: MEDICAL GROUP | Facility: PHYSICIAN GROUP | Age: 20
End: 2025-02-24

## 2025-03-05 PROCEDURE — RXMED WILLOW AMBULATORY MEDICATION CHARGE

## 2025-03-07 ENCOUNTER — PHARMACY VISIT (OUTPATIENT)
Dept: PHARMACY | Facility: MEDICAL CENTER | Age: 20
End: 2025-03-07
Payer: COMMERCIAL

## 2025-04-11 ENCOUNTER — OFFICE VISIT (OUTPATIENT)
Dept: BEHAVIORAL HEALTH | Facility: CLINIC | Age: 20
End: 2025-04-11
Payer: COMMERCIAL

## 2025-04-11 ENCOUNTER — PHARMACY VISIT (OUTPATIENT)
Dept: PHARMACY | Facility: MEDICAL CENTER | Age: 20
End: 2025-04-11
Payer: COMMERCIAL

## 2025-04-11 DIAGNOSIS — Z86.59 HISTORY OF ANXIETY DISORDER: ICD-10-CM

## 2025-04-11 DIAGNOSIS — F33.42 RECURRENT MAJOR DEPRESSIVE DISORDER, IN FULL REMISSION (HCC): ICD-10-CM

## 2025-04-11 PROCEDURE — 99213 OFFICE O/P EST LOW 20 MIN: CPT | Mod: GC | Performed by: PSYCHIATRY & NEUROLOGY

## 2025-04-11 PROCEDURE — RXMED WILLOW AMBULATORY MEDICATION CHARGE

## 2025-04-11 RX ORDER — VENLAFAXINE HYDROCHLORIDE 37.5 MG/1
37.5 CAPSULE, EXTENDED RELEASE ORAL DAILY
Qty: 10 CAPSULE | Refills: 0 | Status: SHIPPED | OUTPATIENT
Start: 2025-04-11 | End: 2025-04-21

## 2025-04-11 RX ORDER — BUPROPION HYDROCHLORIDE 300 MG/1
300 TABLET ORAL EVERY MORNING
Qty: 90 TABLET | Refills: 2 | Status: SHIPPED | OUTPATIENT
Start: 2025-04-11 | End: 2026-01-06

## 2025-04-11 NOTE — PROGRESS NOTES
"[facility] Outpatient Psychiatric Follow Up Note  Evaluation completed by: Vernon Concepcion M.D.   Date of Service: 04/11/2025  Appointment type: in-office appointment.  Attending:  Dr. Mart Costa M.D.  Information below was collected from: Patient    HPI:   Dori Martinez is a 19 y.o. old female who presents today for regularly scheduled follow up for assessment. She describes school is going well, that she has 15 months left in it. She feels that other things are going \"pretty good\" as well. She describes feeling her emotions are \"normal\" and \"much more regulated\", and that they've been this way \"at least a year or two\". She reports she is not feeling anxiety or depression. She reports anxiety comes from feeling overwhelmed, that she hasn't been feeling overwhelmed and has been balancing work and school. She reports no recreational substance use, that she is only drinking alcohol occasionally. She feels she has good social support, has her mom, boyfriend, friends at work and school. She is getting exercise, reports she and her boyfriend go to the gym. She has completed psychotherapy for now, was told by her psychotherapist at the end of last year that she's doing well and doesn't need to come in if she doesn't want to.     PSYCHIATRIC REVIEW OF SYSTEMS: current symptoms as reported by pt.  Sleep: Patient does not describe or suggest recent changes in sleep as a clinically relevant concern, reports it's \"good\" and that 8 or 9 hours of sleep per night  Appetite: Patient does not describe or suggest recent changes in appetite as a clinically relevant concern, reports it's \"good\"   Depression: Described above in the HPI  Anxiety/Panic Attacks: Described above in the HPI  Dameon: Patient does not communicate signs or symptoms indicative of current or past dameon, hypomania, or bipolar disorder.   Psychosis: Patient does not report signs or symptoms indicative of psychosis.   ADHD: Described above in " the [facility]    CURRENT MEDICATIONS    Current Outpatient Medications:     venlafaxine XR (EFFEXOR XR) 37.5 MG CAPSULE SR 24 HR, Take 1 Capsule by mouth every day for 10 days., Disp: 10 Capsule, Rfl: 0    buPROPion (WELLBUTRIN XL) 300 MG XL tablet, Take 1 Tablet by mouth every morning for 270 days., Disp: 90 Tablet, Rfl: 2     REVIEW OF SYSTEMS   ROS    PAST MEDICAL HISTORY  Past Medical History:   Diagnosis Date    Anxiety     Depression     GERD (gastroesophageal reflux disease)     Ovarian cyst     Panic disorder     Self-injurious behavior     Suicide attempt (HCC)      Allergies   Allergen Reactions    Influenza Vaccine Live Hives     Hives to face and arms; NASAL MIST ONLY     No past surgical history on file.   Family History   Problem Relation Age of Onset    Diabetes Mother     Alcohol abuse Father     Breast Cancer Maternal Aunt     Alcohol abuse Maternal Grandfather     Hypertension Maternal Grandfather     Hyperlipidemia Maternal Grandfather     Alcohol abuse Paternal Grandfather     Other Paternal Grandfather         PTSD related to [place] War    Psychiatric Illness Paternal Grandfather         depression, ptsd    Alcohol abuse Paternal Grandmother     Cancer Maternal Aunt     Breast Cancer Maternal Aunt     Autism Brother      Social History     Socioeconomic History    Marital status: Single    Highest education level: Some college, no degree   Tobacco Use    Smoking status: Never    Smokeless tobacco: Never   Vaping Use    Vaping status: Never Used   Substance and Sexual Activity    Alcohol use: Yes    Drug use: Not Currently     Types: Marijuana, Inhaled     Comment: marijuana    Sexual activity: Yes     Partners: Male     Birth control/protection: Implant, Condom Male   Social History Narrative    ** Merged History Encounter **          Social Drivers of Health     Financial Resource Strain: Medium Risk (6/30/2024)    Overall Financial Resource Strain (CARDIA)     Difficulty of Paying Living Expenses:  "Somewhat hard   Food Insecurity: No Food Insecurity (6/30/2024)    Hunger Vital Sign     Worried About Running Out of Food in the Last Year: Never true     Ran Out of Food in the Last Year: Never true   Transportation Needs: No Transportation Needs (6/30/2024)    PRAPARE - Transportation     Lack of Transportation (Medical): No     Lack of Transportation (Non-Medical): No   Physical Activity: Sufficiently Active (6/30/2024)    Exercise Vital Sign     Days of Exercise per Week: 4 days     Minutes of Exercise per Session: 60 min   Stress: Stress Concern Present (6/30/2024)    [facility] of Occupational Health - Occupational Stress Questionnaire     Feeling of Stress : To some extent   Social Connections: Socially Isolated (6/30/2024)    Social Connection and Isolation Panel [NHANES]     Frequency of Communication with Friends and Family: More than three times a week     Frequency of Social Gatherings with Friends and Family: More than three times a week     Attends Shinto Services: Never     Active Member of Clubs or Organizations: No     Attends Club or Organization Meetings: Never     Marital Status: Never    Housing Stability: Low Risk  (6/30/2024)    Housing Stability Vital Sign     Unable to Pay for Housing in the Last Year: No     Number of Places Lived in the Last Year: 1     Unstable Housing in the Last Year: No     No past surgical history on file.    PSYCHIATRIC EXAMINATION   Musculoskeletal: No movement abnormalities noted during interview; grossly within normal limits   Appearance: Patient appeared calm and cooperative with interview   Thought Process: Grossly linear, logical, and goal-oriented   Abnormal or Psychotic Thoughts: No psychotic thoughts or communication consistent with psychosis noted during interview   Speech: Regular rate, rhythm, tone, and volume   Mood: \"ok\"   Affect: Grossly neutral and regular in keeping with typical expectations of conversation during clinical interview "   SI/HI: Denies SI, no evidence of HI  Orientation: No gross evidence of disorientation; alert and conversational   Recent and Remote Memory: No gross evidence of abnormalities in recent or remote memory noted during interview  Attention Span and Concentration: Sufficient for interview  Insight/Judgement into symptoms: Grossly fair  Neurological Testing (MSSE Score and/or clock drawing): Not performed during this interview     SCREENINGS:      1/27/2023    11:00 AM 3/4/2024    11:00 AM 2/19/2025     3:40 PM   Depression Screen (PHQ-2/PHQ-9)   PHQ-2 Total Score 2 1 0   PHQ-9 Total Score 9 1          3/4/2024    11:16 AM 1/27/2023    11:23 AM 1/5/2023    10:27 AM 1/21/2022     1:39 PM    BOYD-7 ANXIETY SCALE FLOWSHEET   Feeling nervous, anxious, or on edge 1 1 2 3   Not being able to stop or control worrying 0 0 0 3   Worrying too much about different things 0 0 0 3   Trouble relaxing 1 1 0 3   Being so restless that it is hard to sit still 1 2 0 2   Becoming easily annoyed or irritable 1 0 1 2   Feeling afraid as if something awful might happen 0 0 0 1   BOYD-7 Total Score 4 4 3 17   How difficult have these problems made it for you to do your work, take care of things at home, or get along with other people? Not difficult at all Not difficult at all Not difficult at all Very difficult     [place]  records  PDMP Reviewed. No evidence indicating misuse of a controlled substance noted.    CURRENT RISK ASSESSMENT:        Suicide: Low       Homicide: Low       Self-Harm: Low       Relapse: Not Applicable       Crisis Safety Plan Reviewed: Not Indicated     ASSESSMENT/DIAGNOSES/PLAN  Dori Martinez is a 19 y.o. old female who presents today for regularly scheduled follow up for assessment. She is currently feeling good, has good social support, and has no recent major adjustments in her life described in this appointment with no current depression or anxiety. Tapering off venlafaxine is merited at this time, and  may consider tapering off wellbutrin at future appointments    Problem List Items Addressed This Visit       Recurrent major depressive disorder, in full remission (HCC)    Relevant Medications    venlafaxine XR (EFFEXOR XR) 37.5 MG CAPSULE SR 24 HR    buPROPion (WELLBUTRIN XL) 300 MG XL tablet     Other Visit Diagnoses         History of anxiety disorder            - Change venlafaxine XR from 75mg to 37.5mg XR for 10 days, then stop  - Continue bupropion xl 30mg per day at this time    Medication options, alternatives (including no medications) and medication risks/benefits/side effects were discussed in detail.  The patient was advised to call, message clinician on [employer], or come in to the clinic if symptoms worsen or if questions/issues regarding their medications arise.  The patient verbalized understanding and agreement.    The patient was educated to call 911, call the suicide hotline, or go to the local ER if having thoughts of suicide or homicide.  The patient verbalized understanding and agreement.   The proposed treatment plan was discussed with the patient who was provided the opportunity to ask questions and make suggestions regarding alternative treatment. Patient verbalized understanding and expressed agreement with the plan.      Follow up in approx. 1 month    This appointment was supervised by attending psychiatrist, Dr. Mart Costa M.D., who agrees with assessment and treatment plan.  See attending attestation for more details.

## 2025-04-21 ENCOUNTER — APPOINTMENT (OUTPATIENT)
Dept: URGENT CARE | Facility: PHYSICIAN GROUP | Age: 20
End: 2025-04-21
Payer: COMMERCIAL

## 2025-04-22 ENCOUNTER — APPOINTMENT (OUTPATIENT)
Dept: URGENT CARE | Facility: PHYSICIAN GROUP | Age: 20
End: 2025-04-22
Payer: COMMERCIAL

## 2025-04-22 ENCOUNTER — OFFICE VISIT (OUTPATIENT)
Dept: URGENT CARE | Facility: PHYSICIAN GROUP | Age: 20
End: 2025-04-22
Payer: COMMERCIAL

## 2025-04-22 VITALS
BODY MASS INDEX: 36.53 KG/M2 | WEIGHT: 241 LBS | OXYGEN SATURATION: 97 % | HEIGHT: 68 IN | HEART RATE: 102 BPM | TEMPERATURE: 97.7 F | SYSTOLIC BLOOD PRESSURE: 130 MMHG | RESPIRATION RATE: 16 BRPM | DIASTOLIC BLOOD PRESSURE: 72 MMHG

## 2025-04-22 DIAGNOSIS — S93.492A SPRAIN OF ANTERIOR TALOFIBULAR LIGAMENT OF LEFT ANKLE, INITIAL ENCOUNTER: Primary | ICD-10-CM

## 2025-04-22 DIAGNOSIS — M25.572 ACUTE LEFT ANKLE PAIN: ICD-10-CM

## 2025-04-22 PROCEDURE — 3075F SYST BP GE 130 - 139MM HG: CPT

## 2025-04-22 PROCEDURE — 3078F DIAST BP <80 MM HG: CPT

## 2025-04-22 PROCEDURE — 99214 OFFICE O/P EST MOD 30 MIN: CPT

## 2025-04-22 RX ORDER — VENLAFAXINE 37.5 MG/1
37.5 TABLET ORAL 3 TIMES DAILY
COMMUNITY

## 2025-04-22 ASSESSMENT — ENCOUNTER SYMPTOMS
SHORTNESS OF BREATH: 0
COUGH: 0
CHILLS: 0
NAUSEA: 0
SORE THROAT: 0
VOMITING: 0
DIARRHEA: 0
EYE REDNESS: 0
SPUTUM PRODUCTION: 0
EYE DISCHARGE: 0
PALPITATIONS: 0
DIZZINESS: 0
STRIDOR: 0
MYALGIAS: 0
WHEEZING: 0
EYE PAIN: 0
HEADACHES: 0
FEVER: 0
ABDOMINAL PAIN: 0

## 2025-04-22 ASSESSMENT — FIBROSIS 4 INDEX: FIB4 SCORE: 0.21

## 2025-04-22 NOTE — PROGRESS NOTES
Subjective:   Dori Martinez is a 19 y.o. female who presents for Ankle Pain (L x over a week.  No known injury. )          I introduced myself to the patient and informed them that I am a Family Nurse Practitioner.    DEVONTE Meadows is a 19 year-old female who comes in today with c/o L ankle injury. Onset was 1 week ago.  Patient states they were running on a treadmill at the gym,  have been getting pain and swelling since.  Patient describes symptoms as constant. They describe the pain as sharp, aching. Aggravating factors include weightbearing, mobilizing. Relieving factors include rest, offloading. Treatments tried at home include Tylenol with some relief. They describe their symptoms as moderate.       Review of Systems   Constitutional:  Negative for chills, fever and malaise/fatigue.   HENT:  Negative for congestion, ear pain and sore throat.    Eyes:  Negative for pain, discharge and redness.   Respiratory:  Negative for cough, sputum production, shortness of breath, wheezing and stridor.    Cardiovascular:  Negative for chest pain and palpitations.   Gastrointestinal:  Negative for abdominal pain, diarrhea, nausea and vomiting.   Genitourinary:  Negative for dysuria.   Musculoskeletal:  Positive for joint pain. Negative for myalgias.        Positive for L ankle pain   Skin:  Negative for rash.   Neurological:  Negative for dizziness and headaches.       Medications: buPROPion  etonogestrel Impl  venlafaxine Tabs     Allergies: Influenza vaccine live    Problem List: does not have any pertinent problems on file.    Surgical History:  No past surgical history on file.    Past Social Hx:   reports that she has never smoked. She has never used smokeless tobacco. She reports current alcohol use. She reports that she does not currently use drugs after having used the following drugs: Marijuana and Inhaled.     Past Family Hx:   family history includes Alcohol abuse in her father, maternal grandfather,  "paternal grandfather, and paternal grandmother; Autism in her brother; Breast Cancer in her maternal aunt and maternal aunt; Cancer in her maternal aunt; Diabetes in her mother; Hyperlipidemia in her maternal grandfather; Hypertension in her maternal grandfather; Other in her paternal grandfather; Psychiatric Illness in her paternal grandfather.     Problem list, medications, and allergies reviewed by myself today in Epic.   I have documented what I find to be significant in regards to past medical, social, family and surgical history  in my HPI or under PMH/PSH/FH review section, otherwise it is noncontributory     Objective:     /72   Pulse (!) 102   Temp 36.5 °C (97.7 °F) (Temporal)   Resp 16   Ht 1.727 m (5' 8\")   Wt 109 kg (241 lb)   SpO2 97%   BMI 36.64 kg/m²     During this visit, appropriate PPE was worn, and hand hygiene was performed.    Physical Exam  Vitals reviewed.   Constitutional:       General: She is not in acute distress.     Appearance: Normal appearance. She is normal weight. She is not ill-appearing or toxic-appearing.   HENT:      Head: Normocephalic and atraumatic.      Right Ear: External ear normal.      Left Ear: External ear normal.      Nose: No congestion or rhinorrhea.   Eyes:      General: No scleral icterus.        Right eye: No discharge.         Left eye: No discharge.      Extraocular Movements: Extraocular movements intact.      Conjunctiva/sclera: Conjunctivae normal.   Cardiovascular:      Rate and Rhythm: Normal rate.   Pulmonary:      Effort: Pulmonary effort is normal.   Abdominal:      General: There is no distension.   Genitourinary:     Comments: Deferred  Musculoskeletal:         General: No swelling or tenderness. Normal range of motion.      Right lower leg: No edema.      Left lower leg: No edema.      Comments: Patient does have an antalgic gait, limping on the L foot and ankle.  Patient is able to bear weight.  Mid calf tib/fib squeeze test is negative " for sign of syndesmotic injury/high ankle sprain.  There is TTP over the anterior joint line  especially laterally over the ATFL, and pain to range of motion especially to plantarflexion, and inversion, as well as some pain to external rotation of the ankle.  There are no obvious bony deformities or step-offs palpated.  There is no TTP over the lateral or medial malleolus.   There is no TTP over the base of the fifth metatarsal.   There is no navicular TTP.   There is no TTP to the distal 6 cm of the fibula.  Anterior ankle drawer test is negative, posterior drawer is negative.  There is no area of erythema or ecchymosis present, mild edema visible at the anterior joint line of the dorsal foot/ankle.  NVID with cap refill less than 3 seconds to distal digits, pedal pulses palpable at 1+ DP/PT, sensation intact to hard and soft to foot and distal digits.    Skin:     General: Skin is warm and dry.   Neurological:      General: No focal deficit present.      Mental Status: She is alert and oriented to person, place, and time. Mental status is at baseline.   Psychiatric:         Mood and Affect: Mood normal.         Behavior: Behavior normal.         Thought Content: Thought content normal.         Judgment: Judgment normal.     Using [place] Ankle rule, bony injury -   Ruled out  Foot x-ray series not indicated.    Assessment/Plan:     Diagnosis and associated orders:     1. Sprain of anterior talofibular ligament of left ankle, initial encounter  Referral to Sports Medicine      2. Acute left ankle pain  Referral to Sports Medicine         Comments/MDM:     1. Sprain of anterior talofibular ligament of left ankle, initial encounter (Primary)  Discussed with patient Dx sprain of the L /ankle sprain, probably the  ATF ligament. Discussed possible  DDx, management options (risks,benefits, and alternatives to planned treatment), natural progression.  I did go over the need for x-rays with patient, given that she has not  had no fall, no trauma to the area and per the [place] ankle rule fracture or bony injury is ruled out, I suggest we defer x-ray today, patient is agreeable.  Supportive care measures were discussed including the following -   RICE  Tylenol and ibuprofen for pain and inflammation, discussed appropriate dosages.  Ace wrap to ankle, rewrap as needed  Tall orthopedic walking boot, wear 24/7 especially anytime you are weightbearing or mobilizing.  May remove for bathing  Crutches, nonweightbearing  WBAT until you follow-up with Sports Medicine  Instructed patient ongoing assessment of foot and ankle for color, movement, sensation  Discussed red flags and reasons return to urgent care versus when to go to the emergency room for increased pain, loss of color, movement, or sensation to the foot or toes, inability to bear weight, any increased redness, swelling, localized heat, especially if there is fever, chills, nausea and vomiting, lethargy.    discussed with patient  that I have made a referral to  Sports Medicine for follow-up, encouraged mother to call to make an appointment, phone number and location provided    - Referral to Sports Medicine    2. Acute left ankle pain  - Referral to Sports Medicine          Pt is clinically stable at today's acute urgent care visit. Vital signs are normal and reassuring.  No acute distress noted. Appropriate for outpatient management at this time.        I personally reviewed prior external notes and test results pertinent to today's visit.  I have independently reviewed and interpreted all diagnostics ordered during this urgent care acute visit.        Please note that this dictation was created using voice recognition software. I have made a reasonable attempt to correct obvious errors, but I expect that there are errors of grammar and possibly content that I did not discover before finalizing the note.    This note was electronically signed by Marco A EASON, VALENTINE, CWS,  CFCN

## 2025-04-28 SDOH — HEALTH STABILITY: PHYSICAL HEALTH: ON AVERAGE, HOW MANY DAYS PER WEEK DO YOU ENGAGE IN MODERATE TO STRENUOUS EXERCISE (LIKE A BRISK WALK)?: 3 DAYS

## 2025-04-28 SDOH — ECONOMIC STABILITY: INCOME INSECURITY: IN THE LAST 12 MONTHS, WAS THERE A TIME WHEN YOU WERE NOT ABLE TO PAY THE MORTGAGE OR RENT ON TIME?: NO

## 2025-04-28 SDOH — ECONOMIC STABILITY: FOOD INSECURITY: WITHIN THE PAST 12 MONTHS, THE FOOD YOU BOUGHT JUST DIDN'T LAST AND YOU DIDN'T HAVE MONEY TO GET MORE.: NEVER TRUE

## 2025-04-28 SDOH — ECONOMIC STABILITY: INCOME INSECURITY: HOW HARD IS IT FOR YOU TO PAY FOR THE VERY BASICS LIKE FOOD, HOUSING, MEDICAL CARE, AND HEATING?: NOT VERY HARD

## 2025-04-28 SDOH — HEALTH STABILITY: PHYSICAL HEALTH: ON AVERAGE, HOW MANY MINUTES DO YOU ENGAGE IN EXERCISE AT THIS LEVEL?: 60 MIN

## 2025-04-28 SDOH — ECONOMIC STABILITY: FOOD INSECURITY: WITHIN THE PAST 12 MONTHS, YOU WORRIED THAT YOUR FOOD WOULD RUN OUT BEFORE YOU GOT MONEY TO BUY MORE.: NEVER TRUE

## 2025-04-28 ASSESSMENT — SOCIAL DETERMINANTS OF HEALTH (SDOH)
HOW OFTEN DO YOU ATTEND CHURCH OR RELIGIOUS SERVICES?: NEVER
HOW OFTEN DO YOU GET TOGETHER WITH FRIENDS OR RELATIVES?: THREE TIMES A WEEK
ARE YOU MARRIED, WIDOWED, DIVORCED, SEPARATED, NEVER MARRIED, OR LIVING WITH A PARTNER?: NEVER MARRIED
DO YOU BELONG TO ANY CLUBS OR ORGANIZATIONS SUCH AS CHURCH GROUPS UNIONS, FRATERNAL OR ATHLETIC GROUPS, OR SCHOOL GROUPS?: NO
IN THE PAST 12 MONTHS, HAS THE ELECTRIC, GAS, OIL, OR WATER COMPANY THREATENED TO SHUT OFF SERVICE IN YOUR HOME?: NO
IN A TYPICAL WEEK, HOW MANY TIMES DO YOU TALK ON THE PHONE WITH FAMILY, FRIENDS, OR NEIGHBORS?: MORE THAN THREE TIMES A WEEK
HOW OFTEN DO YOU GET TOGETHER WITH FRIENDS OR RELATIVES?: THREE TIMES A WEEK
HOW HARD IS IT FOR YOU TO PAY FOR THE VERY BASICS LIKE FOOD, HOUSING, MEDICAL CARE, AND HEATING?: NOT VERY HARD
IN THE PAST 12 MONTHS, HAS THE ELECTRIC, GAS, OIL, OR WATER COMPANY THREATENED TO SHUT OFF SERVICE IN YOUR HOME?: NO
HOW OFTEN DO YOU ATTENT MEETINGS OF THE CLUB OR ORGANIZATION YOU BELONG TO?: NEVER
HOW OFTEN DO YOU ATTEND CHURCH OR RELIGIOUS SERVICES?: NEVER
IN A TYPICAL WEEK, HOW MANY TIMES DO YOU TALK ON THE PHONE WITH FAMILY, FRIENDS, OR NEIGHBORS?: MORE THAN THREE TIMES A WEEK
HOW MANY DRINKS CONTAINING ALCOHOL DO YOU HAVE ON A TYPICAL DAY WHEN YOU ARE DRINKING: 1 OR 2
IN A TYPICAL WEEK, HOW MANY TIMES DO YOU TALK ON THE PHONE WITH FAMILY, FRIENDS, OR NEIGHBORS?: MORE THAN THREE TIMES A WEEK
ARE YOU MARRIED, WIDOWED, DIVORCED, SEPARATED, NEVER MARRIED, OR LIVING WITH A PARTNER?: NEVER MARRIED
HOW OFTEN DO YOU GET TOGETHER WITH FRIENDS OR RELATIVES?: THREE TIMES A WEEK
HOW OFTEN DO YOU HAVE A DRINK CONTAINING ALCOHOL: MONTHLY OR LESS
DO YOU BELONG TO ANY CLUBS OR ORGANIZATIONS SUCH AS CHURCH GROUPS UNIONS, FRATERNAL OR ATHLETIC GROUPS, OR SCHOOL GROUPS?: NO
HOW OFTEN DO YOU ATTENT MEETINGS OF THE CLUB OR ORGANIZATION YOU BELONG TO?: NEVER
HOW OFTEN DO YOU HAVE SIX OR MORE DRINKS ON ONE OCCASION: NEVER
HOW OFTEN DO YOU ATTENT MEETINGS OF THE CLUB OR ORGANIZATION YOU BELONG TO?: NEVER
HOW OFTEN DO YOU ATTEND CHURCH OR RELIGIOUS SERVICES?: NEVER
ARE YOU MARRIED, WIDOWED, DIVORCED, SEPARATED, NEVER MARRIED, OR LIVING WITH A PARTNER?: NEVER MARRIED
WITHIN THE PAST 12 MONTHS, YOU WORRIED THAT YOUR FOOD WOULD RUN OUT BEFORE YOU GOT THE MONEY TO BUY MORE: NEVER TRUE
DO YOU BELONG TO ANY CLUBS OR ORGANIZATIONS SUCH AS CHURCH GROUPS UNIONS, FRATERNAL OR ATHLETIC GROUPS, OR SCHOOL GROUPS?: NO

## 2025-04-28 ASSESSMENT — LIFESTYLE VARIABLES
HOW MANY STANDARD DRINKS CONTAINING ALCOHOL DO YOU HAVE ON A TYPICAL DAY: 1 OR 2
SKIP TO QUESTIONS 9-10: 1
SKIP TO QUESTIONS 9-10: 1
HOW OFTEN DO YOU HAVE SIX OR MORE DRINKS ON ONE OCCASION: NEVER
HOW MANY STANDARD DRINKS CONTAINING ALCOHOL DO YOU HAVE ON A TYPICAL DAY: 1 OR 2
HOW OFTEN DO YOU HAVE A DRINK CONTAINING ALCOHOL: MONTHLY OR LESS
AUDIT-C TOTAL SCORE: 1
HOW OFTEN DO YOU HAVE A DRINK CONTAINING ALCOHOL: MONTHLY OR LESS
HOW OFTEN DO YOU HAVE SIX OR MORE DRINKS ON ONE OCCASION: NEVER
AUDIT-C TOTAL SCORE: 1

## 2025-04-30 SDOH — HEALTH STABILITY: PHYSICAL HEALTH: ON AVERAGE, HOW MANY DAYS PER WEEK DO YOU ENGAGE IN MODERATE TO STRENUOUS EXERCISE (LIKE A BRISK WALK)?: 3 DAYS

## 2025-04-30 SDOH — ECONOMIC STABILITY: FOOD INSECURITY: WITHIN THE PAST 12 MONTHS, THE FOOD YOU BOUGHT JUST DIDN'T LAST AND YOU DIDN'T HAVE MONEY TO GET MORE.: NEVER TRUE

## 2025-04-30 SDOH — ECONOMIC STABILITY: INCOME INSECURITY: HOW HARD IS IT FOR YOU TO PAY FOR THE VERY BASICS LIKE FOOD, HOUSING, MEDICAL CARE, AND HEATING?: NOT VERY HARD

## 2025-04-30 SDOH — ECONOMIC STABILITY: FOOD INSECURITY: WITHIN THE PAST 12 MONTHS, YOU WORRIED THAT YOUR FOOD WOULD RUN OUT BEFORE YOU GOT MONEY TO BUY MORE.: NEVER TRUE

## 2025-04-30 SDOH — HEALTH STABILITY: PHYSICAL HEALTH: ON AVERAGE, HOW MANY MINUTES DO YOU ENGAGE IN EXERCISE AT THIS LEVEL?: 60 MIN

## 2025-04-30 SDOH — ECONOMIC STABILITY: INCOME INSECURITY: IN THE LAST 12 MONTHS, WAS THERE A TIME WHEN YOU WERE NOT ABLE TO PAY THE MORTGAGE OR RENT ON TIME?: NO

## 2025-04-30 ASSESSMENT — SOCIAL DETERMINANTS OF HEALTH (SDOH)
HOW OFTEN DO YOU ATTENT MEETINGS OF THE CLUB OR ORGANIZATION YOU BELONG TO?: NEVER
IN THE PAST 12 MONTHS, HAS THE ELECTRIC, GAS, OIL, OR WATER COMPANY THREATENED TO SHUT OFF SERVICE IN YOUR HOME?: NO
HOW OFTEN DO YOU GET TOGETHER WITH FRIENDS OR RELATIVES?: THREE TIMES A WEEK
WITHIN THE PAST 12 MONTHS, YOU WORRIED THAT YOUR FOOD WOULD RUN OUT BEFORE YOU GOT THE MONEY TO BUY MORE: NEVER TRUE
ARE YOU MARRIED, WIDOWED, DIVORCED, SEPARATED, NEVER MARRIED, OR LIVING WITH A PARTNER?: NEVER MARRIED
DO YOU BELONG TO ANY CLUBS OR ORGANIZATIONS SUCH AS CHURCH GROUPS UNIONS, FRATERNAL OR ATHLETIC GROUPS, OR SCHOOL GROUPS?: NO
HOW OFTEN DO YOU GET TOGETHER WITH FRIENDS OR RELATIVES?: THREE TIMES A WEEK
IN A TYPICAL WEEK, HOW MANY TIMES DO YOU TALK ON THE PHONE WITH FAMILY, FRIENDS, OR NEIGHBORS?: MORE THAN THREE TIMES A WEEK
HOW OFTEN DO YOU ATTENT MEETINGS OF THE CLUB OR ORGANIZATION YOU BELONG TO?: NEVER
HOW OFTEN DO YOU ATTEND CHURCH OR RELIGIOUS SERVICES?: NEVER
HOW OFTEN DO YOU HAVE A DRINK CONTAINING ALCOHOL: MONTHLY OR LESS
HOW HARD IS IT FOR YOU TO PAY FOR THE VERY BASICS LIKE FOOD, HOUSING, MEDICAL CARE, AND HEATING?: NOT VERY HARD
ARE YOU MARRIED, WIDOWED, DIVORCED, SEPARATED, NEVER MARRIED, OR LIVING WITH A PARTNER?: NEVER MARRIED
HOW OFTEN DO YOU HAVE SIX OR MORE DRINKS ON ONE OCCASION: NEVER
HOW OFTEN DO YOU ATTEND CHURCH OR RELIGIOUS SERVICES?: NEVER
HOW MANY DRINKS CONTAINING ALCOHOL DO YOU HAVE ON A TYPICAL DAY WHEN YOU ARE DRINKING: 1 OR 2
DO YOU BELONG TO ANY CLUBS OR ORGANIZATIONS SUCH AS CHURCH GROUPS UNIONS, FRATERNAL OR ATHLETIC GROUPS, OR SCHOOL GROUPS?: NO
IN A TYPICAL WEEK, HOW MANY TIMES DO YOU TALK ON THE PHONE WITH FAMILY, FRIENDS, OR NEIGHBORS?: MORE THAN THREE TIMES A WEEK

## 2025-04-30 ASSESSMENT — LIFESTYLE VARIABLES
HOW OFTEN DO YOU HAVE A DRINK CONTAINING ALCOHOL: MONTHLY OR LESS
HOW MANY STANDARD DRINKS CONTAINING ALCOHOL DO YOU HAVE ON A TYPICAL DAY: 1 OR 2
HOW OFTEN DO YOU HAVE SIX OR MORE DRINKS ON ONE OCCASION: NEVER
SKIP TO QUESTIONS 9-10: 1
AUDIT-C TOTAL SCORE: 1

## 2025-05-01 ENCOUNTER — APPOINTMENT (OUTPATIENT)
Dept: SPORTS MEDICINE | Facility: OTHER | Age: 20
End: 2025-05-01
Payer: COMMERCIAL

## 2025-05-01 ENCOUNTER — APPOINTMENT (OUTPATIENT)
Dept: LAB | Facility: MEDICAL CENTER | Age: 20
End: 2025-05-01
Payer: COMMERCIAL

## 2025-05-01 ENCOUNTER — APPOINTMENT (OUTPATIENT)
Dept: RADIOLOGY | Facility: OTHER | Age: 20
End: 2025-05-01
Attending: FAMILY MEDICINE
Payer: COMMERCIAL

## 2025-05-01 VITALS
DIASTOLIC BLOOD PRESSURE: 80 MMHG | SYSTOLIC BLOOD PRESSURE: 118 MMHG | WEIGHT: 241 LBS | BODY MASS INDEX: 36.53 KG/M2 | OXYGEN SATURATION: 98 % | RESPIRATION RATE: 16 BRPM | TEMPERATURE: 98.7 F | HEART RATE: 93 BPM | HEIGHT: 68 IN

## 2025-05-01 DIAGNOSIS — M25.572 SINUS TARSITIS OF LEFT FOOT: ICD-10-CM

## 2025-05-01 DIAGNOSIS — M25.572 ACUTE LEFT ANKLE PAIN: ICD-10-CM

## 2025-05-01 PROCEDURE — 3074F SYST BP LT 130 MM HG: CPT | Performed by: FAMILY MEDICINE

## 2025-05-01 PROCEDURE — 73610 X-RAY EXAM OF ANKLE: CPT | Mod: TC,FY,LT | Performed by: FAMILY MEDICINE

## 2025-05-01 PROCEDURE — 3079F DIAST BP 80-89 MM HG: CPT | Performed by: FAMILY MEDICINE

## 2025-05-01 PROCEDURE — 99214 OFFICE O/P EST MOD 30 MIN: CPT | Performed by: FAMILY MEDICINE

## 2025-05-01 ASSESSMENT — ENCOUNTER SYMPTOMS
CHILLS: 0
SHORTNESS OF BREATH: 0
VOMITING: 0
DIZZINESS: 0
FEVER: 0
NAUSEA: 0

## 2025-05-01 ASSESSMENT — FIBROSIS 4 INDEX: FIB4 SCORE: 0.21

## 2025-05-01 NOTE — PROGRESS NOTES
Chief Complaint   Patient presents with    Ankle Pain     L ankle pain     Subjective     Referred by JENARO Baer  for evaluation of LEFT ankle pain  Date of injury, Monday, April 14, 2025  Mechanism, running and treadmill, no specific injury, but started experiencing pain about 20 minutes into her run  Pain is sharp and predominantly at the lateral elbow with some radiation into the dorsal midfoot  Symptoms are worse with inversion  No ecchymosis or known swelling  No night symptoms  She has tried compression elevation as well as icing which helps some  No imaging has been performed    Also has issues with BILATERAL knee pain, known history of patellofemoral syndrome with tracking issues diagnosed back in [place] back in 2021    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   Respiratory:  Negative for shortness of breath.    Cardiovascular:  Negative for chest pain.   Gastrointestinal:  Negative for nausea and vomiting.   Neurological:  Negative for dizziness.     PMH:  has a past medical history of Anxiety, Depression, GERD (gastroesophageal reflux disease), Ovarian cyst, Panic disorder, Self-injurious behavior, and Suicide attempt ([facility]).    She has no past medical history of Alcohol abuse, Bipolar disorder ([facility]), Psychosis ([facility]), PTSD (post-traumatic stress disorder), Seizure ([facility]), or Substance abuse ([facility]).  MEDS:   Current Outpatient Medications:     venlafaxine (EFFEXOR) 37.5 MG Tab, Take 37.5 mg by mouth 3 times a day., Disp: , Rfl:     etonogestrel (NEXPLANON) 68 MG Implant implant, , Disp: , Rfl:     buPROPion (WELLBUTRIN XL) 300 MG XL tablet, Take 1 Tablet by mouth every morning for 270 days., Disp: 90 Tablet, Rfl: 2  ALLERGIES:   Allergies   Allergen Reactions    Influenza Vaccine Live Hives     Hives to face and arms; NASAL MIST ONLY     SURGHX: History reviewed. No pertinent  "surgical history.  SOCHX:  reports that she has never smoked. She has never used smokeless tobacco. She reports current alcohol use. She reports that she does not currently use drugs after having used the following drugs: Marijuana and Inhaled.  FH: Family history was reviewed, no pertinent findings to report    Objective   /80 (BP Location: Left arm, Patient Position: Sitting, BP Cuff Size: Adult)   Pulse 93   Temp 37.1 °C (98.7 °F) (Temporal)   Resp 16   Ht 1.727 m (5' 8\")   Wt 109 kg (241 lb)   SpO2 98%   BMI 36.64 kg/m²     RIGHT ANKLE:  There is NO swelling noted at the ankle  Range of motion intact with dorsiflexion and plantarflexion, inversion and eversion  There is NO tenderness of the ATFL, CF or PTF ligament  There is NO tenderness of the lateral malleolus or medial malleolus  Anterior drawer testing is NEGATIVE  Talar tilt testing is NEGATIVE  The foot and ankle is otherwise neurovascularly intact    RIGHT FOOT:  There is NO swelling noted at the foot  There is NO tenderness at the base of the fifth metatarsal, cuboid, or tarsal navicular  There is NO pain with metatarsal squeeze test    LEFT ANKLE:  There is NO swelling noted at the ankle  Range of motion intact with dorsiflexion and plantarflexion, inversion and eversion  There is NO tenderness of the ATFL, CF or PTF ligament  There is NO tenderness of the lateral malleolus or medial malleolus  Anterior drawer testing is NEGATIVE  POSITIVE tenderness at the LEFT sinus Tarsi compared to the right  Talar tilt testing is NEGATIVE  The foot and ankle is otherwise neurovascularly intact    LEFT FOOT:  There is NO swelling noted at the foot  There is NO tenderness at the base of the fifth metatarsal, cuboid, or tarsal navicular  There is NO pain with metatarsal squeeze test    NEUTRAL stance  Able to ambulate with NORMAL gait  Able to toe off bilaterally    1. Sinus tarsitis of left foot  DX-ANKLE 3+ VIEWS LEFT        Date of injury, Monday, " April 14, 2025  Mechanism, running and treadmill, no specific injury, but started experiencing pain about 20 minutes into her run    Provided with foot and ankle exercise program  Recommend Pilkiet  Recommend midrise Superfeet orthotic    Return in about 4 weeks (around 5/29/2025).  To see how she is doing with home exercise program, orthotics and Pilates katelynn at that point          5/1/2025 3:31 PM     HISTORY/REASON FOR EXAM:  Lateral ankle pain        TECHNIQUE/EXAM DESCRIPTION AND NUMBER OF VIEWS:  3 views of the LEFT ankle.     COMPARISON: None.     FINDINGS:  No acute fracture or dislocation. The ankle mortise is intact.     No joint arthropathy.     IMPRESSION:     No acute osseous abnormality.        Exam Ended: 05/01/25  3:41 PM Last Resulted: 05/01/25  3:53 PM         Interpreted in the office today with the patient    Thank you JENARO Baer for allowing me to participate in care of your patient

## 2025-05-05 ENCOUNTER — APPOINTMENT (OUTPATIENT)
Dept: SPORTS MEDICINE | Facility: OTHER | Age: 20
End: 2025-05-05
Payer: COMMERCIAL

## 2025-05-07 ENCOUNTER — OFFICE VISIT (OUTPATIENT)
Dept: URGENT CARE | Facility: PHYSICIAN GROUP | Age: 20
End: 2025-05-07
Payer: COMMERCIAL

## 2025-05-07 ENCOUNTER — APPOINTMENT (OUTPATIENT)
Dept: URGENT CARE | Facility: PHYSICIAN GROUP | Age: 20
End: 2025-05-07
Payer: COMMERCIAL

## 2025-05-07 ENCOUNTER — RESULTS FOLLOW-UP (OUTPATIENT)
Dept: URGENT CARE | Facility: PHYSICIAN GROUP | Age: 20
End: 2025-05-07

## 2025-05-07 VITALS
RESPIRATION RATE: 18 BRPM | SYSTOLIC BLOOD PRESSURE: 146 MMHG | HEIGHT: 68 IN | WEIGHT: 248.9 LBS | OXYGEN SATURATION: 97 % | BODY MASS INDEX: 37.72 KG/M2 | DIASTOLIC BLOOD PRESSURE: 108 MMHG | TEMPERATURE: 98.6 F | HEART RATE: 116 BPM

## 2025-05-07 DIAGNOSIS — J02.9 ACUTE PHARYNGITIS, UNSPECIFIED ETIOLOGY: ICD-10-CM

## 2025-05-07 LAB — S PYO DNA SPEC NAA+PROBE: NOT DETECTED

## 2025-05-07 PROCEDURE — 3080F DIAST BP >= 90 MM HG: CPT | Performed by: NURSE PRACTITIONER

## 2025-05-07 PROCEDURE — 87651 STREP A DNA AMP PROBE: CPT | Performed by: NURSE PRACTITIONER

## 2025-05-07 PROCEDURE — 99213 OFFICE O/P EST LOW 20 MIN: CPT | Performed by: NURSE PRACTITIONER

## 2025-05-07 PROCEDURE — 3077F SYST BP >= 140 MM HG: CPT | Performed by: NURSE PRACTITIONER

## 2025-05-07 ASSESSMENT — FIBROSIS 4 INDEX: FIB4 SCORE: 0.21

## 2025-05-16 ENCOUNTER — PHARMACY VISIT (OUTPATIENT)
Dept: PHARMACY | Facility: MEDICAL CENTER | Age: 20
End: 2025-05-16
Payer: COMMERCIAL

## 2025-05-16 ENCOUNTER — OFFICE VISIT (OUTPATIENT)
Dept: BEHAVIORAL HEALTH | Facility: CLINIC | Age: 20
End: 2025-05-16
Payer: COMMERCIAL

## 2025-05-16 DIAGNOSIS — Z86.59 HISTORY OF ANXIETY DISORDER: Primary | ICD-10-CM

## 2025-05-16 DIAGNOSIS — F33.42 RECURRENT MAJOR DEPRESSIVE DISORDER, IN FULL REMISSION (HCC): ICD-10-CM

## 2025-05-16 PROCEDURE — RXMED WILLOW AMBULATORY MEDICATION CHARGE

## 2025-05-16 PROCEDURE — 99999 PR NO CHARGE: CPT | Performed by: PSYCHIATRY & NEUROLOGY

## 2025-05-16 RX ORDER — BUPROPION HYDROCHLORIDE 300 MG/1
300 TABLET ORAL EVERY MORNING
Qty: 90 TABLET | Refills: 2 | Status: SHIPPED | OUTPATIENT
Start: 2025-05-16 | End: 2026-02-10

## 2025-05-16 ASSESSMENT — ENCOUNTER SYMPTOMS
WHEEZING: 0
FEVER: 0
FALLS: 0
PALPITATIONS: 0
BLOOD IN STOOL: 0
BLURRED VISION: 0
SEIZURES: 0

## 2025-05-16 ASSESSMENT — LIFESTYLE VARIABLES: SUBSTANCE_ABUSE: 0

## 2025-05-16 NOTE — PROGRESS NOTES
[facility] Outpatient Psychiatric Follow Up Note  Evaluation completed by: Vernon Concepcion M.D.   Date of Service: 05/16/2025  Appointment type: in-office appointment.  Attending: Dr. Mart Costa M.D.  Information below was collected from: Patient    HPI:   Dori Martinez is a 19 y.o. old female who presents today for regularly scheduled follow up for assessment. She describes she's been doing well, is finishing the semester and preparing to start next term in school in June. She reports over the past few weeks, she's been able to push past times of feeling difficulty staying motivated to stay on top of things. She has not been feeling depressed or sad, feels times of worry are nowhere near what they used to be and are in proportion to situations and helpful rather than harmful feelings. She reports she had a cold when she sent a [employer] message about difficulty sleeping, she took Nyquil and that worked, with no resumption of Effexor which was discontinued at last appointment. She isn't feeling overwhelmed, and wants to continue wellbutrin at this time with no reported side effects.    PSYCHIATRIC REVIEW OF SYSTEMS: current symptoms as reported by pt.  Sleep: Reports getting 8-9 hours of sleep now, sleeping much better than previously  Appetite: Patient does not describe or suggest recent changes in appetite as a clinically relevant concern   Depression: Described above in the HPI  Anxiety/Panic Attacks: Described above in the HPI  Dameon: Patient does not communicate signs or symptoms indicative of current or past dameon, hypomania, or bipolar disorder.   Psychosis: Patient does not report signs or symptoms indicative of psychosis.   ADHD: Described above in the HPI    CURRENT MEDICATIONS  Current Medications[1]     REVIEW OF SYSTEMS   Review of Systems   Constitutional:  Negative for fever.   HENT:  Negative for hearing loss.    Eyes:  Negative for blurred vision.   Respiratory:  Negative for  "wheezing.    Cardiovascular:  Negative for chest pain and palpitations.   Gastrointestinal:  Negative for blood in stool.   Genitourinary:  Negative for hematuria.   Musculoskeletal:  Negative for falls.   Skin:  Negative for rash.   Neurological:  Negative for seizures.   Psychiatric/Behavioral:  Negative for substance abuse and suicidal ideas.        PAST MEDICAL HISTORY  Past Medical History[2]  Allergies[3]  Past Surgical History[4]   Family History   Problem Relation Age of Onset    Diabetes Mother     Alcohol abuse Father     Breast Cancer Maternal Aunt     Alcohol abuse Maternal Grandfather     Hypertension Maternal Grandfather     Hyperlipidemia Maternal Grandfather     Alcohol abuse Paternal Grandfather     Other Paternal Grandfather         PTSD related to [place] War    Psychiatric Illness Paternal Grandfather         depression, ptsd    Alcohol abuse Paternal Grandmother     Cancer Maternal Aunt     Breast Cancer Maternal Aunt     Autism Brother      Social History[5]  Past Surgical History[6]    PSYCHIATRIC EXAMINATION   Musculoskeletal: No movement abnormalities noted during interview; grossly within normal limits   Appearance: Patient appeared calm and cooperative with interview   Thought Process: Grossly linear, logical, and goal-oriented   Abnormal or Psychotic Thoughts: No psychotic thoughts or communication consistent with psychosis noted during interview   Speech: Regular rate, rhythm, tone, and volume   Mood: \"good\"   Affect: Grossly neutral and regular in keeping with typical expectations of conversation during clinical interview   SI/HI: Denies SI, no evidence of HI  Orientation: No gross evidence of disorientation; alert and conversational   Recent and Remote Memory: No gross evidence of abnormalities in recent or remote memory noted during interview  Attention Span and Concentration: Sufficient for interview  Insight/Judgement into symptoms: Grossly fair  Neurological Testing (MSSE Score " and/or clock drawing): Not performed during this interview     SCREENINGS:      1/27/2023    11:00 AM 3/4/2024    11:00 AM 2/19/2025     3:40 PM   Depression Screen (PHQ-2/PHQ-9)   PHQ-2 Total Score 2 1 0   PHQ-9 Total Score 9 1          3/4/2024    11:16 AM 1/27/2023    11:23 AM 1/5/2023    10:27 AM 1/21/2022     1:39 PM    BOYD-7 ANXIETY SCALE FLOWSHEET   Feeling nervous, anxious, or on edge 1 1 2 3   Not being able to stop or control worrying 0 0 0 3   Worrying too much about different things 0 0 0 3   Trouble relaxing 1 1 0 3   Being so restless that it is hard to sit still 1 2 0 2   Becoming easily annoyed or irritable 1 0 1 2   Feeling afraid as if something awful might happen 0 0 0 1   BOYD-7 Total Score 4 4 3 17   How difficult have these problems made it for you to do your work, take care of things at home, or get along with other people? Not difficult at all Not difficult at all Not difficult at all Very difficult     [place]  records  PDMP Reviewed. No evidence indicating misuse of a controlled substance noted.    CURRENT RISK ASSESSMENT:        Suicide: Low       Homicide: Low       Self-Harm: Low       Relapse: Not Applicable       Crisis Safety Plan Reviewed: Not Indicated     ASSESSMENT/DIAGNOSES/PLAN  Dori Martinez is a 19 y.o. old female who presents today for regularly scheduled follow up for assessment. She is currently feeling good, has good social support, and has no recent major adjustments in her life described in this appointment with no current depression or anxiety.  Continuing Wellbutrin is merited at this time.    Problem List Items Addressed This Visit       Recurrent major depressive disorder, in full remission (HCC)    Relevant Medications    buPROPion (WELLBUTRIN XL) 300 MG XL tablet   - Continue bupropion xl 300mg per day at this time       Medication options, alternatives (including no medications) and medication risks/benefits/side effects were discussed in detail.  The  patient was advised to call, message clinician on sickweatherhart, or come in to the clinic if symptoms worsen or if questions/issues regarding their medications arise.  The patient verbalized understanding and agreement.    The patient was educated to call 911, call the suicide hotline, or go to the local ER if having thoughts of suicide or homicide.  The patient verbalized understanding and agreement.   The proposed treatment plan was discussed with the patient who was provided the opportunity to ask questions and make suggestions regarding alternative treatment. Patient verbalized understanding and expressed agreement with the plan.      Follow up in approx 2-3 months    This appointment was supervised by attending psychiatrist, Dr. Mart Costa M.D., who agrees with assessment and treatment plan.  See attending attestation for more details.          [1]   Current Outpatient Medications:     buPROPion (WELLBUTRIN XL) 300 MG XL tablet, Take 1 Tablet by mouth every morning for 270 days., Disp: 90 Tablet, Rfl: 2    etonogestrel (NEXPLANON) 68 MG Implant implant, , Disp: , Rfl:   [2]   Past Medical History:  Diagnosis Date    Anxiety     Depression     GERD (gastroesophageal reflux disease)     Ovarian cyst     Panic disorder     Self-injurious behavior     Suicide attempt (HCC)    [3]   Allergies  Allergen Reactions    Influenza Vaccine Live Hives     Hives to face and arms; NASAL MIST ONLY   [4] No past surgical history on file.  [5]   Social History  Socioeconomic History    Marital status: Single    Highest education level: Some college, no degree   Tobacco Use    Smoking status: Never    Smokeless tobacco: Never   Vaping Use    Vaping status: Never Used   Substance and Sexual Activity    Alcohol use: Yes    Drug use: Not Currently     Types: Marijuana, Inhaled     Comment: marijuana    Sexual activity: Yes     Partners: Male     Birth control/protection: Implant, Condom Male   Social History Narrative    ** Merged  History Encounter **          Social Drivers of Health     Financial Resource Strain: Low Risk  (4/30/2025)    Overall Financial Resource Strain (CARDIA)     Difficulty of Paying Living Expenses: Not very hard   Food Insecurity: No Food Insecurity (4/30/2025)    Hunger Vital Sign     Worried About Running Out of Food in the Last Year: Never true     Ran Out of Food in the Last Year: Never true   Transportation Needs: No Transportation Needs (4/30/2025)    PRAPARE - Transportation     Lack of Transportation (Medical): No     Lack of Transportation (Non-Medical): No   Physical Activity: Sufficiently Active (4/30/2025)    Exercise Vital Sign     Days of Exercise per Week: 3 days     Minutes of Exercise per Session: 60 min   Stress: Stress Concern Present (4/30/2025)    [employer] - Occupational Stress Questionnaire     Feeling of Stress : To some extent   Social Connections: Socially Isolated (4/30/2025)    Social Connection and Isolation Panel [NHANES]     Frequency of Communication with Friends and Family: More than three times a week     Frequency of Social Gatherings with Friends and Family: Three times a week     Attends Druze Services: Never     Active Member of Clubs or Organizations: No     Attends Club or Organization Meetings: Never     Marital Status: Never    Housing Stability: Low Risk  (4/30/2025)    Housing Stability Vital Sign     Unable to Pay for Housing in the Last Year: No     Number of Times Moved in the Last Year: 0     Homeless in the Last Year: No   [6] No past surgical history on file.

## 2025-08-20 ENCOUNTER — HOSPITAL ENCOUNTER (OUTPATIENT)
Dept: LAB | Facility: MEDICAL CENTER | Age: 20
End: 2025-08-20
Payer: COMMERCIAL

## 2025-08-20 ENCOUNTER — APPOINTMENT (OUTPATIENT)
Dept: MEDICAL GROUP | Facility: PHYSICIAN GROUP | Age: 20
End: 2025-08-20
Payer: COMMERCIAL

## 2025-08-20 DIAGNOSIS — Z11.3 SCREEN FOR SEXUALLY TRANSMITTED DISEASES: ICD-10-CM

## 2025-08-20 DIAGNOSIS — Z00.00 WELLNESS EXAMINATION: ICD-10-CM

## 2025-08-20 LAB
ALBUMIN SERPL BCP-MCNC: 4.4 G/DL (ref 3.2–4.9)
ALBUMIN/GLOB SERPL: 1.3 G/DL
ALP SERPL-CCNC: 53 U/L (ref 30–99)
ALT SERPL-CCNC: 37 U/L (ref 2–50)
ANION GAP SERPL CALC-SCNC: 11 MMOL/L (ref 7–16)
AST SERPL-CCNC: 20 U/L (ref 12–45)
BILIRUB SERPL-MCNC: 0.2 MG/DL (ref 0.1–1.5)
BUN SERPL-MCNC: 11 MG/DL (ref 8–22)
C TRACH DNA SPEC QL NAA+PROBE: NEGATIVE
CALCIUM ALBUM COR SERPL-MCNC: 9.2 MG/DL (ref 8.5–10.5)
CALCIUM SERPL-MCNC: 9.5 MG/DL (ref 8.5–10.5)
CHLORIDE SERPL-SCNC: 108 MMOL/L (ref 96–112)
CHOLEST SERPL-MCNC: 216 MG/DL (ref 100–199)
CO2 SERPL-SCNC: 20 MMOL/L (ref 20–33)
CREAT SERPL-MCNC: 0.85 MG/DL (ref 0.5–1.4)
ERYTHROCYTE [DISTWIDTH] IN BLOOD BY AUTOMATED COUNT: 41.1 FL (ref 35.9–50)
GFR SERPLBLD CREATININE-BSD FMLA CKD-EPI: 99 ML/MIN/1.73 M 2
GLOBULIN SER CALC-MCNC: 3.3 G/DL (ref 1.9–3.5)
GLUCOSE SERPL-MCNC: 84 MG/DL (ref 65–99)
HCT VFR BLD AUTO: 45.7 % (ref 37–47)
HDLC SERPL-MCNC: 40 MG/DL
HGB BLD-MCNC: 15 G/DL (ref 12–16)
LDLC SERPL CALC-MCNC: 158 MG/DL
MCH RBC QN AUTO: 28.4 PG (ref 27–33)
MCHC RBC AUTO-ENTMCNC: 32.8 G/DL (ref 32.2–35.5)
MCV RBC AUTO: 86.6 FL (ref 81.4–97.8)
N GONORRHOEA DNA SPEC QL NAA+PROBE: NEGATIVE
PLATELET # BLD AUTO: 314 K/UL (ref 164–446)
PMV BLD AUTO: 9.8 FL (ref 9–12.9)
POTASSIUM SERPL-SCNC: 4.4 MMOL/L (ref 3.6–5.5)
PROT SERPL-MCNC: 7.7 G/DL (ref 6–8.2)
RBC # BLD AUTO: 5.28 M/UL (ref 4.2–5.4)
SODIUM SERPL-SCNC: 139 MMOL/L (ref 135–145)
SPECIMEN SOURCE: NORMAL
TRIGL SERPL-MCNC: 91 MG/DL (ref 0–149)
TSH SERPL DL<=0.005 MIU/L-ACNC: 3 UIU/ML (ref 0.38–5.33)
WBC # BLD AUTO: 7.4 K/UL (ref 4.8–10.8)

## 2025-08-20 PROCEDURE — 87491 CHLMYD TRACH DNA AMP PROBE: CPT

## 2025-08-20 PROCEDURE — 85027 COMPLETE CBC AUTOMATED: CPT

## 2025-08-20 PROCEDURE — 84443 ASSAY THYROID STIM HORMONE: CPT

## 2025-08-20 PROCEDURE — 36415 COLL VENOUS BLD VENIPUNCTURE: CPT

## 2025-08-20 PROCEDURE — 87591 N.GONORRHOEAE DNA AMP PROB: CPT

## 2025-08-20 PROCEDURE — 80061 LIPID PANEL: CPT

## 2025-08-20 PROCEDURE — 80053 COMPREHEN METABOLIC PANEL: CPT

## 2025-08-20 ASSESSMENT — FIBROSIS 4 INDEX: FIB4 SCORE: 0.22
